# Patient Record
Sex: MALE | Race: WHITE | Employment: UNEMPLOYED | URBAN - METROPOLITAN AREA
[De-identification: names, ages, dates, MRNs, and addresses within clinical notes are randomized per-mention and may not be internally consistent; named-entity substitution may affect disease eponyms.]

---

## 2021-05-06 ENCOUNTER — HOSPITAL ENCOUNTER (INPATIENT)
Age: 46
LOS: 8 days | Discharge: HOME | DRG: 754 | End: 2021-05-14
Attending: PSYCHIATRY & NEUROLOGY | Admitting: PSYCHIATRY & NEUROLOGY
Payer: MEDICAID

## 2021-05-06 LAB
GLUCOSE BLD STRIP.AUTO-MCNC: 363 MG/DL (ref 65–100)
PERFORMED BY, TECHID: ABNORMAL

## 2021-05-06 PROCEDURE — 82962 GLUCOSE BLOOD TEST: CPT

## 2021-05-06 PROCEDURE — 65220000003 HC RM SEMIPRIVATE PSYCH

## 2021-05-07 PROBLEM — F32.A DEPRESSION: Status: ACTIVE | Noted: 2021-05-07
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
## 2021-05-07 PROBLEM — F19.10 SUBSTANCE ABUSE (HCC): Status: ACTIVE | Noted: 2021-05-07

## 2021-05-07 LAB
GLUCOSE BLD STRIP.AUTO-MCNC: 236 MG/DL (ref 65–100)
GLUCOSE BLD STRIP.AUTO-MCNC: 300 MG/DL (ref 65–100)
GLUCOSE BLD STRIP.AUTO-MCNC: 305 MG/DL (ref 65–100)
PERFORMED BY, TECHID: ABNORMAL

## 2021-05-07 PROCEDURE — 74011636637 HC RX REV CODE- 636/637: Performed by: FAMILY MEDICINE

## 2021-05-07 PROCEDURE — 82962 GLUCOSE BLOOD TEST: CPT

## 2021-05-07 PROCEDURE — 74011250637 HC RX REV CODE- 250/637: Performed by: PSYCHIATRY & NEUROLOGY

## 2021-05-07 PROCEDURE — 65220000003 HC RM SEMIPRIVATE PSYCH

## 2021-05-07 RX ORDER — IBUPROFEN 600 MG/1
600 TABLET ORAL
Status: DISCONTINUED | OUTPATIENT
Start: 2021-05-07 | End: 2021-05-14 | Stop reason: HOSPADM

## 2021-05-07 RX ORDER — MAGNESIUM SULFATE 100 %
4 CRYSTALS MISCELLANEOUS AS NEEDED
Status: DISCONTINUED | OUTPATIENT
Start: 2021-05-07 | End: 2021-05-13 | Stop reason: SDUPTHER

## 2021-05-07 RX ORDER — ACETAMINOPHEN 325 MG/1
650 TABLET ORAL
Status: DISCONTINUED | OUTPATIENT
Start: 2021-05-07 | End: 2021-05-14 | Stop reason: HOSPADM

## 2021-05-07 RX ORDER — CHLORDIAZEPOXIDE HYDROCHLORIDE 25 MG/1
25 CAPSULE, GELATIN COATED ORAL
Status: DISCONTINUED | OUTPATIENT
Start: 2021-05-07 | End: 2021-05-07

## 2021-05-07 RX ORDER — ASPIRIN 325 MG/1
100 TABLET, FILM COATED ORAL DAILY
Status: DISCONTINUED | OUTPATIENT
Start: 2021-05-07 | End: 2021-05-14 | Stop reason: HOSPADM

## 2021-05-07 RX ORDER — QUETIAPINE FUMARATE 100 MG/1
100 TABLET, FILM COATED ORAL 2 TIMES DAILY
Status: DISCONTINUED | OUTPATIENT
Start: 2021-05-07 | End: 2021-05-07

## 2021-05-07 RX ORDER — QUETIAPINE FUMARATE 100 MG/1
100 TABLET, FILM COATED ORAL 3 TIMES DAILY
Status: DISCONTINUED | OUTPATIENT
Start: 2021-05-07 | End: 2021-05-07

## 2021-05-07 RX ORDER — HYDROXYZINE 50 MG/1
50 TABLET, FILM COATED ORAL
Status: DISCONTINUED | OUTPATIENT
Start: 2021-05-07 | End: 2021-05-14 | Stop reason: HOSPADM

## 2021-05-07 RX ORDER — CHLORDIAZEPOXIDE HYDROCHLORIDE 25 MG/1
25 CAPSULE, GELATIN COATED ORAL 3 TIMES DAILY
Status: DISCONTINUED | OUTPATIENT
Start: 2021-05-07 | End: 2021-05-08

## 2021-05-07 RX ORDER — TRAZODONE HYDROCHLORIDE 50 MG/1
50 TABLET ORAL
Status: DISCONTINUED | OUTPATIENT
Start: 2021-05-07 | End: 2021-05-14 | Stop reason: HOSPADM

## 2021-05-07 RX ORDER — QUETIAPINE FUMARATE 300 MG/1
300 TABLET, FILM COATED ORAL
Status: DISCONTINUED | OUTPATIENT
Start: 2021-05-07 | End: 2021-05-14 | Stop reason: HOSPADM

## 2021-05-07 RX ORDER — ADHESIVE BANDAGE
30 BANDAGE TOPICAL DAILY PRN
Status: DISCONTINUED | OUTPATIENT
Start: 2021-05-07 | End: 2021-05-14 | Stop reason: HOSPADM

## 2021-05-07 RX ORDER — FOLIC ACID 1 MG/1
1 TABLET ORAL DAILY
Status: DISCONTINUED | OUTPATIENT
Start: 2021-05-08 | End: 2021-05-14 | Stop reason: HOSPADM

## 2021-05-07 RX ORDER — IBUPROFEN 200 MG
1 TABLET ORAL DAILY
Status: DISCONTINUED | OUTPATIENT
Start: 2021-05-07 | End: 2021-05-14 | Stop reason: HOSPADM

## 2021-05-07 RX ORDER — INSULIN LISPRO 100 [IU]/ML
INJECTION, SOLUTION INTRAVENOUS; SUBCUTANEOUS
Status: DISCONTINUED | OUTPATIENT
Start: 2021-05-07 | End: 2021-05-14 | Stop reason: HOSPADM

## 2021-05-07 RX ORDER — QUETIAPINE FUMARATE 100 MG/1
100 TABLET, FILM COATED ORAL 2 TIMES DAILY
Status: DISCONTINUED | OUTPATIENT
Start: 2021-05-08 | End: 2021-05-11

## 2021-05-07 RX ADMIN — TRAZODONE HYDROCHLORIDE 50 MG: 50 TABLET ORAL at 01:09

## 2021-05-07 RX ADMIN — HYDROXYZINE HYDROCHLORIDE 50 MG: 50 TABLET, FILM COATED ORAL at 11:35

## 2021-05-07 RX ADMIN — IBUPROFEN 600 MG: 600 TABLET ORAL at 14:18

## 2021-05-07 RX ADMIN — CHLORDIAZEPOXIDE HYDROCHLORIDE 25 MG: 25 CAPSULE ORAL at 11:35

## 2021-05-07 RX ADMIN — HYDROXYZINE HYDROCHLORIDE 50 MG: 50 TABLET, FILM COATED ORAL at 01:09

## 2021-05-07 RX ADMIN — QUETIAPINE FUMARATE 100 MG: 100 TABLET ORAL at 09:41

## 2021-05-07 RX ADMIN — QUETIAPINE FUMARATE 100 MG: 100 TABLET ORAL at 01:09

## 2021-05-07 RX ADMIN — CHLORDIAZEPOXIDE HYDROCHLORIDE 25 MG: 25 CAPSULE ORAL at 01:09

## 2021-05-07 RX ADMIN — CHLORDIAZEPOXIDE HYDROCHLORIDE 25 MG: 25 CAPSULE ORAL at 20:22

## 2021-05-07 RX ADMIN — QUETIAPINE FUMARATE 300 MG: 300 TABLET ORAL at 20:22

## 2021-05-07 RX ADMIN — INSULIN LISPRO 5 UNITS: 100 INJECTION, SOLUTION INTRAVENOUS; SUBCUTANEOUS at 22:00

## 2021-05-07 RX ADMIN — QUETIAPINE FUMARATE 100 MG: 100 TABLET ORAL at 15:08

## 2021-05-07 RX ADMIN — CHLORDIAZEPOXIDE HYDROCHLORIDE 25 MG: 25 CAPSULE ORAL at 15:08

## 2021-05-07 NOTE — BH NOTES
42 yr old white male admitted to [address] from [facility] Emergency Department. Admitted under the care of Dr. Kimberly Ruiz MD. Patient was brought to their ED by EMS with c/o suicidal ideation, he stated he had snorted xanax and drank a bottle of crown royal. Patient has a history of etoh, cocaine, heroin abuse. Has track marks bilateral arms. Alcohol level was 163.6, negative on all tox screens. Patient has a medical history of Hep C, neuropathy, and uncontrolled diabetes. Patient states he smokes a pack a day. No known allergies, vital signs on arrival to [address] [place] [address]: b/p 119/78, pulse 76, temp. 98.2, respirations 18, O2 98%. Safety search completed, orders received, orientated to the unit, placed on q15 min checks per unit protocol.

## 2021-05-07 NOTE — BH NOTES
PSYCHOSOCIAL ASSESSMENT  :Patient identifying info:   Xu Loaiza is a 42 y.o., male admitted 5/6/2021 10:11 PM     Presenting problem and precipitating factors: Pt is a 42year old male voluntarily admitted for SI with a plan to shoot himself or slit his wrist. Pt stated that he had been drinking and that he wanted to die. Pt reported that he did not wish to live anymore. Pt did not provide any other details. Mental status assessment: Pt presented flat, agitated and depressed. Pt was oriented to time, place, and location. Pt was very agitated and ended the assessment early because he was not feeling well. Pt provided minimal detail behind his answers. Strengths: Pt did not identify any strengths     Collateral information: Pt signed an ANDRZEJ for his sister     Current psychiatric /substance abuse providers and contact info: Pt denies any current psychiatric providers. Previous psychiatric/substance abuse providers and response to treatment: Pt reported that he has been hospitalized before at [facility]. Pt stated he had been there about 5-6 times. Pt reported being at [facility] when he was 14 for substance abuse     Family history of mental illness or substance abuse: Pt stated that his father and older sister both have bipolar pt reported that he has a cousin that is schizophrenic   Substance abuse history:    Social History     Tobacco Use    Smoking status: Not on file   Substance Use Topics    Alcohol use: Not on file     Pt repots that he uses alcohol, opiates and xanax. Pt reported that he would use any substance that he can get his hands on. Pt stated that he has had substance abuse treatment when he was 14years old at [facility]. Pt reports that he uses a significant amount of alcohol and drugs per day. Pt stated that he has seizures if he does not have alcohol.    History of biomedical complications associated with substance abuse : Seizures     Patient's current acceptance of treatment or motivation for change: Minimal     Family constellation: pt reported having one daughter who he has custody of but does not live with, he reports that she lives with her boyfriend. Pt does not speak to his mother or father. Pt reports that he has a younger sister Autumn Bacon who he speaks with. Is significant other involved?   n/a    Describe support system: Pt denies having any support     Describe living arrangements and home environment: Pt reports currently living in [place] in his daughters grandfathers home   Health issues:   Hospital Problems  Never Reviewed          Codes Class Noted POA    Substance abuse [facility]) ICD-10-CM: F19.10  ICD-9-CM: 305.90  2021 Unknown        [facility] ([facility] ICD-10-CM: I59. 8XXA  ICD-9-CM: E958.9  2021 Unknown        Depression ICD-10-CM: F32.9  ICD-9-CM: 197  2021 Unknown              Trauma history: Pt denies    Legal issues: Pt denies     History of  service: Pt denies     Financial status: Pt does not work     Denominational/cultural factors:  Unable to be assessed pt ended meeting early     Education/work history: Unable to be assessed pt ended meeting early     Have you been licensed as a health care professional (current or ):  Unable to be assessed pt ended meeting early     Leisure and recreation preferences: Unable to be assessed pt ended meeting early     Describe coping skills:Unable to be assessed pt ended meeting early     Yadira Jonas  2021

## 2021-05-07 NOTE — GROUP NOTE
IP  GROUP DOCUMENTATION INDIVIDUAL Group Therapy Note Date: 5/7/2021 Group Start Time: 1400 Group End Time: 1500 Group Topic: Special Track - Drug Topic SRM 2 [facility] ACUTE Carmine Khoury, RT 
 
[facility] GROUP DOCUMENTATION GROUP Group Therapy Note Protracted Withdrawal 
 
Attendees: 8 Attendance: Did not attend Additional Notes:  Pt was invited to attend group but did not.  
 
Jarrod Pina, RT

## 2021-05-07 NOTE — GROUP NOTE
KIMBERLY  GROUP DOCUMENTATION INDIVIDUAL Group Therapy Note Date: 5/7/2021 Group Start Time: 1500 Group End Time: 1736 Group Topic: Process Group - Inpatient [facility]M CARE MANAGEMENT MAGGIE Dodson  GROUP DOCUMENTATION GROUP Group Therapy Note Pts participated in process group therapy with this writer. Pts discussed thoughts feelings and emotions regarding mental health. Pts were encouraged to share and give feedback to peers. Attendees: 5/11 Attendance: Did not attend Charlie Tellez LCSW

## 2021-05-07 NOTE — GROUP NOTE
KIMBERLY  GROUP DOCUMENTATION INDIVIDUAL Group Therapy Note Date: 5/7/2021 Group Start Time: 1100 Group End Time: 6145 Group Topic: Education Group - Inpatient [facility]M CARE MANAGEMENT MAGGIE Chao  GROUP DOCUMENTATION GROUP Group Therapy Note Pts participated in Psychoeducational group therapy. Pts dicussed trauma and the brain, triggers, and trauma responses. Pts were encouraged to share and give feedback to others. Attendees: 5/11 Attendance: Did not attend Viktoriya Chavez LCSW

## 2021-05-07 NOTE — BH NOTES
[id number], dictation id    Temp (°F) 98.2 97.9  97.9 (36.6)         Pulse (Heart Rate) 76 5670  70        Resp Rate 18 18  18        /78 115/71120/81  120/81           Current Facility-Administered Medications:     hydrOXYzine HCL (ATARAX) tablet 50 mg, 50 mg, Oral, TID PRN, Pratik Garcia MD, 50 mg at 05/07/21 1135    traZODone (DESYREL) tablet 50 mg, 50 mg, Oral, QHS PRN, Pratik Garcia MD, 50 mg at 05/07/21 0109    acetaminophen (TYLENOL) tablet 650 mg, 650 mg, Oral, Q4H PRN, Pratik Garcia MD    magnesium hydroxide (MILK OF MAGNESIA) 400 mg/5 mL oral suspension 30 mL, 30 mL, Oral, DAILY PRN, Sita Moran MD    nicotine (NICODERM CQ) 14 mg/24 hr patch 1 Patch, 1 Patch, TransDERmal, DAILY, Sita Moran MD    chlordiazePOXIDE (LIBRIUM) capsule 25 mg, 25 mg, Oral, TID, Sita Moran MD, 25 mg at 05/07/21 1508    ibuprofen (MOTRIN) tablet 600 mg, 600 mg, Oral, Q6H PRN, Sita Moran MD, 600 mg at 05/07/21 1418    QUEtiapine (SEROquel) tablet 100 mg, 100 mg, Oral, TID, Sita Moran MD, 100 mg at 05/07/21 1508

## 2021-05-07 NOTE — BH NOTES
RICHARD     Pt repots that he uses alcohol, opiates and xanax. Pt reported that he would use any substance that he can get his hands on. Pt stated that he has had substance abuse treatment when he was 14years old at [facility]. Pt reports that he uses a significant amount of alcohol and drugs per day. Pt stated that he has seizures if he does not have alcohol.

## 2021-05-07 NOTE — BH NOTES
Patient visible on unit. Alert & oriented. Affect, flat. Appearance is disheveled. Patient is polite and cooperative with receiving medications; complains of anxiety, 10/10, before lunchtime. Accepted PO medications with no side effects reported. Up for meals, consuming 100%. No group sessions attended thus far. Patient states he is from [place] and graduated from [employer]. He reports using xanax off the street. Informed patient that his UDS was negative. He denies SI and contracts for safety. Awaiting medical H&P. Accucheck 0800 236mg/dL, 1200 306mg/dL. Encouraged to seek support from staff as needed. Will continue to monitor on close observation to ensure patient safety and follow treatment plan as written. 1400 Patient sitting on floor by nurse station, saying he wants to die, he is having pain, anxiety. [place] head repeatedly on wall. Re-directed and encouraged to lay on bed. Rates pain 10/10, anxiety 10/10. Complains of right leg pain and lower back pain. Dr. Demetrius Moreira contacted for medication modifications. Patient verbalized understanding. /81 HR 70.

## 2021-05-07 NOTE — GROUP NOTE
IP  GROUP DOCUMENTATION INDIVIDUAL Group Therapy Note Date: 5/7/2021 Group Start Time: 9429 Group End Time: 5947 Group Topic: Special Track - Drug Topic SRM 2 [facility] ACUTE Gerhardt Duff, RT 
 
[facility] GROUP DOCUMENTATION GROUP Group Therapy Note Virtual NA Meeting Attendees  5 Attendance: Did not attend Additional Notes: Pt was invited to attend group but did not.  
 
Aye Dougherty, RT

## 2021-05-07 NOTE — CONSULTS
Consult    Patient: Danette Ambriz MRN: [id number]  SSN: [id number]    YOB: 1975  Age: 42 y.o. Sex: male      Subjective:      Danette Ambriz is a 42 y.o. male who is being seen for alcohol abuse abuse depression and suicidal ideation complains of pain in his legs. No past medical history on file. No past surgical history on file. No family history on file. Social History     Tobacco Use    Smoking status: Not on file   Substance Use Topics    Alcohol use: Not on file      Current Facility-Administered Medications   Medication Dose Route Frequency Provider Last Rate Last Admin    hydrOXYzine HCL (ATARAX) tablet 50 mg  50 mg Oral TID PRN Carmine Levy MD   50 mg at 05/07/21 1135    traZODone (DESYREL) tablet 50 mg  50 mg Oral QHS PRN Carmine Levy MD   50 mg at 05/07/21 0109    acetaminophen (TYLENOL) tablet 650 mg  650 mg Oral Q4H PRN Carmine Levy MD        magnesium hydroxide (MILK OF MAGNESIA) 400 mg/5 mL oral suspension 30 mL  30 mL Oral DAILY PRN Carmine Levy MD        nicotine (NICODERM CQ) 14 mg/24 hr patch 1 Patch  1 Patch TransDERmal DAILY Sita Moran MD        chlordiazePOXIDE (LIBRIUM) capsule 25 mg  25 mg Oral TID Carmine Levy MD   25 mg at 05/07/21 1508    ibuprofen (MOTRIN) tablet 600 mg  600 mg Oral Q6H PRN Carmine Levy MD   600 mg at 05/07/21 1418    thiamine mononitrate (B-1) tablet 100 mg  100 mg Oral DAILY Carmine Levy MD       [address] [place] [address] [START ON 5/9/6786] folic acid (FOLVITE) tablet 1 mg  1 mg Oral DAILY Sita Moran MD        QUEtiapine (SEROquel) tablet 300 mg  300 mg Oral QHS Carmine Levy MD       [address] [place] [address] [START ON 5/8/2021] QUEtiapine (SEROquel) tablet 100 mg  100 mg Oral BID Carmine Levy MD            No Known Allergies    Review of Systems:  A comprehensive review of systems was negative except for that written in the History of Present Illness.     Objective:     Vitals:    05/07/21 0000 05/07/21 0044 05/07/21 0943 05/07/21 1403   BP: 119/78 119/78 115/71 120/81   Pulse: 76 76 (!) 56 70   Resp:  18 18    Temp:  98.2 °F (36.8 °C) 97.9 °F (36.6 °C)         Physical Exam:  General:  Alert, cooperative, no distress, appears stated age. Eyes:  Conjunctivae/corneas clear. PERRL, EOMs intact. Fundi benign   Ears:  Normal TMs and external ear canals both ears. Nose: Nares normal. Septum midline. Mucosa normal. No drainage or sinus tenderness. Mouth/Throat: Lips, mucosa, and tongue normal. Teeth and gums normal.   Neck: Supple, symmetrical, trachea midline, no adenopathy, thyroid: no enlargment/tenderness/nodules, no carotid bruit and no JVD. Back:   Symmetric, no curvature. ROM normal. No CVA tenderness. Lungs:   Clear to auscultation bilaterally. Heart:  Regular rate and rhythm, S1, S2 normal, no murmur, click, rub or gallop. Abdomen:   Soft, non-tender. Bowel sounds normal. No masses,  No organomegaly. Extremities: Extremities normal, atraumatic, no cyanosis or edema. Exaggerated response   Pulses: 2+ and symmetric all extremities. Skin: Skin color, texture, turgor normal. No rashes or lesions   Lymph nodes: Cervical, supraclavicular, and axillary nodes normal.   Neurologic: CNII-XII intact. Normal strength, sensation and reflexes throughout. Assessment:     Hospital Problems  Never Reviewed          Codes Class Noted POA    Substance abuse [facility]) ICD-10-CM: F19.10  ICD-9-CM: 305.90  5/7/2021 Unknown        Suicide ([facility] ICD-10-CM: Z31. 8XXA  ICD-9-CM: E958.9  5/7/2021 Unknown        Depression ICD-10-CM: F32.9  ICD-9-CM: 793  5/7/2021 Unknown          Drug-seeking behavior    Plan:     Continue present treatment avoid narcotics    Signed By: Bro De La Rosa MD     May 7, 2021

## 2021-05-08 LAB
GLUCOSE BLD STRIP.AUTO-MCNC: 217 MG/DL (ref 65–100)
GLUCOSE BLD STRIP.AUTO-MCNC: 292 MG/DL (ref 65–100)
GLUCOSE BLD STRIP.AUTO-MCNC: 300 MG/DL (ref 65–100)
GLUCOSE BLD STRIP.AUTO-MCNC: 318 MG/DL (ref 65–100)
PERFORMED BY, TECHID: ABNORMAL

## 2021-05-08 PROCEDURE — 82962 GLUCOSE BLOOD TEST: CPT

## 2021-05-08 PROCEDURE — 74011636637 HC RX REV CODE- 636/637: Performed by: FAMILY MEDICINE

## 2021-05-08 PROCEDURE — 74011250637 HC RX REV CODE- 250/637: Performed by: PSYCHIATRY & NEUROLOGY

## 2021-05-08 PROCEDURE — 65220000003 HC RM SEMIPRIVATE PSYCH

## 2021-05-08 RX ORDER — CHLORDIAZEPOXIDE HYDROCHLORIDE 10 MG/1
10 CAPSULE, GELATIN COATED ORAL 3 TIMES DAILY
Status: DISCONTINUED | OUTPATIENT
Start: 2021-05-08 | End: 2021-05-14 | Stop reason: HOSPADM

## 2021-05-08 RX ADMIN — QUETIAPINE FUMARATE 300 MG: 300 TABLET ORAL at 20:11

## 2021-05-08 RX ADMIN — THIAMINE HCL TAB 100 MG 100 MG: 100 TAB at 08:10

## 2021-05-08 RX ADMIN — INSULIN LISPRO 5 UNITS: 100 INJECTION, SOLUTION INTRAVENOUS; SUBCUTANEOUS at 12:11

## 2021-05-08 RX ADMIN — HYDROXYZINE HYDROCHLORIDE 50 MG: 50 TABLET, FILM COATED ORAL at 12:41

## 2021-05-08 RX ADMIN — INSULIN LISPRO 4 UNITS: 100 INJECTION, SOLUTION INTRAVENOUS; SUBCUTANEOUS at 20:11

## 2021-05-08 RX ADMIN — CHLORDIAZEPOXIDE HYDROCHLORIDE 10 MG: 10 CAPSULE ORAL at 20:11

## 2021-05-08 RX ADMIN — QUETIAPINE FUMARATE 100 MG: 100 TABLET ORAL at 14:16

## 2021-05-08 RX ADMIN — IBUPROFEN 600 MG: 600 TABLET ORAL at 12:41

## 2021-05-08 RX ADMIN — CHLORDIAZEPOXIDE HYDROCHLORIDE 10 MG: 10 CAPSULE ORAL at 16:54

## 2021-05-08 RX ADMIN — INSULIN LISPRO 3 UNITS: 100 INJECTION, SOLUTION INTRAVENOUS; SUBCUTANEOUS at 08:10

## 2021-05-08 RX ADMIN — QUETIAPINE FUMARATE 100 MG: 100 TABLET ORAL at 08:11

## 2021-05-08 RX ADMIN — HYDROXYZINE HYDROCHLORIDE 50 MG: 50 TABLET, FILM COATED ORAL at 02:51

## 2021-05-08 RX ADMIN — CHLORDIAZEPOXIDE HYDROCHLORIDE 25 MG: 25 CAPSULE ORAL at 08:10

## 2021-05-08 RX ADMIN — INSULIN LISPRO 4 UNITS: 100 INJECTION, SOLUTION INTRAVENOUS; SUBCUTANEOUS at 16:54

## 2021-05-08 RX ADMIN — FOLIC ACID 1 MG: 1 TABLET ORAL at 08:10

## 2021-05-08 NOTE — GROUP NOTE
IP  GROUP DOCUMENTATION INDIVIDUAL Group Therapy Note Date: 5/8/2021 Group Start Time: 1300 Group End Time: 6206 Group Topic: Process Group - Inpatient SRM 2 BH NON ACUTE [facility] GROUP DOCUMENTATION GROUP Group Therapy Note This writer facilitated a process group. The intended benefit of group is to encourage the patient to share their thoughts and feelings about their treatment, as well as, to seek advice from peers. This writer tasked the patient with completing three journal prompts. This writer discussed the importance of support systems, therapy, and boundaries. Attendees: 3 out of 9 Attendance: Attended Patient's Goal:  N/A Interventions/techniques: N/A Follows Directions: N/A Interactions: N/A Mental Status: N/A Behavior/appearance: N/A Goals Achieved: N/A Additional Notes:  Patient refused to attend group despite encouragement. Sandee Tracy

## 2021-05-08 NOTE — H&P
[address] [facility] HISTORY AND PHYSICAL    Name:  Dolly Brunson  MR#:  [id number]  :  1975  ACCOUNT #:  [de-identified]  ADMIT DATE:  2021    INITIAL PSYCHIATRIC ASSESSMENT    HISTORY OF PRESENT ILLNESS:  The patient is a 61-year-old admitted to the behavioral health floor on [facility] after the patient presenting to the emergency department with depression, suicidal ideation and substance abuse. The patient is a transfer from [facility] Emergency Department. The patient was brought to the ED by EMS. It was noted that the patient has a long history of cocaine use, heroin abuse and ethanol abuse and has multiple track marks in bilateral arms. Information is obtained from review of electronic records, talking to behavioral intake staff, talking to the patient, and information was obtained from the emergency department at [facility]. The patient this morning reports that he was suicidal and currently is suicidal.  The patient is more focused on expressing going through withdrawal from the substances and not feeling well. He reports having body aches and feeling anxious. The patient reports he wants to get back on his medications for his anxiety and withdrawals. Denies any hallucinations or feeling paranoid. Also, the patient reports depressed, feeling hopeless and helpless, poor sleep, loss of interest, lack of motivation. He denied any active intent of suicide at this time. The patient presented in the emergency department at [facility] with a plan to shoot himself or slit his wrist.  The patient had expressed that he had been drinking increasingly and wanted to die. He expressed that he does not want to live anymore. PAST MEDICAL HISTORY:  Includes hepatitis C, neuropathy, diabetes. SUBSTANCE USE:  Includes ethanol, cocaine, heroin abuse. Noted bilateral track marks on his arms.     PAST PSYCHIATRIC HISTORY:  He is noted to have had previous psychiatric hospitalization at [facility].  He has been there 5-6 times, and he has also been reported as being at [facility] and has been treated for substance abuse in the past.  He is not able to provide any current psychiatric providers that he is seeing. FAMILY HISTORY:  He states father and older sister both have bipolar disorder and a cousin who is schizophrenic. FAMILY AND PERSONAL HISTORY:  Noted has one daughter who he has custody of, but does not live with. It is reported that she lives with a boyfriend. The patient does not speak to his mother or father. He keeps in touch with his younger sister, Rowan Ahuja. TRAUMA/ABUSE HISTORY:  Denies. LEGAL ISSUES:  Denies. HISTORY OF  SERVICE:  The patient denies. MENTAL STATUS EXAM:  The patient is alert, oriented to name, place and person. The patient presents dysphoric, anxious, reports needing medications for his withdrawal.  The patient presented with active suicidal ideations with the plan as described above. No command hallucinations. No paranoia noted. Insight, judgment and coping remain impaired. Memory appears fair clinically, but did not cooperate on assessment for immediate recall or delayed recall. ASSESSMENT AND PLAN:  The patient to be placed on close observation, engage him in individual therapy, group therapy as he continues to improve. He is placed on Librium 25 mg three times a day, Seroquel 100 mg three times a day, and he is also placed on trazodone 50 at bedtime and ibuprofen 600 mg q.6 h. as needed for symptomatic treatment. We will place him on thiamine and folic acid. His vitals report temperature 97.9, heart rate of 70, respiratory rate of 18, blood pressure 120/81. LENGTH OF STAY:  5-7 days. STRENGTHS:  Overall appears healthy.     WEAKNESSES:  Poor primary support system and chronic substance abuse and has continued relapse just being a high risk factor for him, nonadherence to treatment recommendations and followup with his medications. To obtain further collateral from family and his previous , we will schedule for family meeting. The patient discussed in treatment team meeting. We will again obtain medical consult as the patient reports diabetes and for medical stabilization. We will contact previous pharmacy to obtain his medication list.    DISCHARGE CRITERIA:  The patient is able to show improvement in his neurovegetative symptoms of depression, anxiety, suicidal ideations, suicidal plans and substance abuse, to gain an insight. The patient is no longer actively suicidal or homicidal and is able to transition to substance abuse treatment.       Alexander Barnett MD      [id number]  D:  05/07/2021 16:52  T:  05/07/2021 22:38  JOB #:  [id number]

## 2021-05-08 NOTE — BH NOTES
SRM Recreational Therapy Assessment    Orientation:  Person, Place, Date and Situation    Reason for Admission:  Pt reported \"he was having suicidal thoughts and feeling depressed but didn't want to talk about why\" Pt reported \"he was using drugs and drinking alcohol\" Pt reported \"he use opiates, meth, cocaine, heroin, and take pills\"     Medical Precautions / Conditions:  Diabetes and Hep C per pt    Impairments:     Vision:  Wears Glasses No      Wears Contacts No      Are they with Patient n/a     Hearing Aids No     Utilization of Ambulatory Devices:  None    Health Problems Preventing Participation in Activities:  Yes  How:  Pt reported \"pt reported he has not been taking care of himself and he has pain in his foot and his legs are messed up\"    Leisure Interest Checklist:  Pt reported \"he has not been doing any leisure activites\"    Does patient participate in leisure activities:  No    Setting:  Alone    Other Activities / Skills / Talents:  None    Do emotions interfere with leisure activities / lifestyles:  Yes    When engaging in leisure activities, do you forget worries:  No    Do you belong to a Muslim:  No    Are you active in [employer] activities:  No    Typical Day:  Pt reported \"he just sit around, smoke cigarettes,get drunk and get high\"    Strengths:  Family Support, Housing and Reported \"hoping the next day be better\" and \"his sister is supportive\"    Limitations / Barriers:  Substance Abuse, Finances, Transportation, Motivation, Depressed Mood, Anxiety, Sleep, Concentration, Hallucinations, Paranoia, Anger / Aggression and Non-Compliance with Meds / Follow Up    Treatment Modalities:  Stress Management, Coping Skills, Symptom Recognition, Healthy Thinking, Mood Management and Leisure Skills    Patient Educational  Needs:  Skills to recognize and challenge problematic thinking, Identify positive coping strategies and skills to manage symptoms or moods, Leisure education and Recognition of symptoms and signs    Focus of Treatment:  Introduce positive outlets for self expression and Introduce and encourage the exploration of alternative coping skills    Summary:  Pt was cooperative during assessment. Pt reported he live alone in a family member house. Pt reported he is not working or receiving disability. Pt reported he has not been following up with any providers. Pt reported he was feeling depressed and having suicidal thoughts.  Pt reported his goal for hospitalization is \"to get level headed and work on a future plan\"

## 2021-05-08 NOTE — PROGRESS NOTES
Progress Note  Date:2021       Room:[facility]  Patient Name:Joe Blair     YOB: 1975     Age:46 y.o. Subjective    Subjective  Patient in bed this morning noted to be in no distress. He states he was able to sleep better last night. Did not voice any side effects he did not want to talk much this morning. We will continue to explore tapering down of his medications on Librium. Mental status examination-patient is alert oriented to name place person. Still appears dishelved, poor eye contact not voiced any active suicidal or homicidal ideations presents more dysphoric insight judgment coping remains impaired    Review of Systems no side effects were voices morning no withdrawal symptoms were voiced this morning  Objective         Vitals Last 24 Hours:  TEMPERATURE:  Temp  Av.8 °F (36.6 °C)  Min: 97.8 °F (36.6 °C)  Max: 97.8 °F (36.6 °C)  RESPIRATIONS RANGE: Resp  Av  Min: 16  Max: 18  PULSE OXIMETRY RANGE: No data recorded  PULSE RANGE: Pulse  Av.7  Min: 63  Max: 70  BLOOD PRESSURE RANGE: Systolic (48WWI), JSI:229 , Min:116 , UEV:781   ; Diastolic (25RET), GBU:02, Min:78, Max:83    I/O (24Hr): No intake or output data in the 24 hours ending 21 1055  Objective  Labs/Imaging/Diagnostics    Labs:  CBC:No results for input(s): WBC, RBC, HGB, HCT, MCV, RDW, PLT, HGBEXT, HCTEXT, PLTEXT in the last 72 hours. CHEMISTRIES:No results for input(s): NA, K, CL, CO2, BUN, CA, PHOS, MG in the last 72 hours. No lab exists for component: CREATININE, GLUCOSEPT/INR:No results for input(s): INR, INREXT in the last 72 hours. No lab exists for component: PROTIME  APTT:No results for input(s): APTT in the last 72 hours. LIVER PROFILE:No results for input(s): AST, ALT in the last 72 hours.     No lab exists for component: BILIDIR, BILITOT, ALKPHOS  No results found for: ALT, AST, GGT, GGTP, AP, APIT, APX, CBIL, TBIL, TBILI    Imaging Last 24 Hours:  No results found.   Assessment//Plan   Active Problems:    Substance abuse ([facility] (5/7/2021)      Suicide ([facility] (5/7/2021)      Depression (5/7/2021)      Assessment & Plan  Decrease Librium to 10 3 times daily  Continue current medications vitals are stable encourage participation in group therapy  2 obtain collateral and address inpatient rehab      Current Facility-Administered Medications:     hydrOXYzine HCL (ATARAX) tablet 50 mg, 50 mg, Oral, TID PRN, Christine Dangelo MD, 50 mg at 05/08/21 0251    traZODone (DESYREL) tablet 50 mg, 50 mg, Oral, QHS PRN, Christine Dangelo MD, 50 mg at 05/07/21 0109    acetaminophen (TYLENOL) tablet 650 mg, 650 mg, Oral, Q4H PRN, Sita Moran MD    magnesium hydroxide (MILK OF MAGNESIA) 400 mg/5 mL oral suspension 30 mL, 30 mL, Oral, DAILY PRN, Sita Moran MD    nicotine (NICODERM CQ) 14 mg/24 hr patch 1 Patch, 1 Patch, TransDERmal, DAILY, Sita Moran MD, 1 Patch at 05/08/21 9485    chlordiazePOXIDE (LIBRIUM) capsule 25 mg, 25 mg, Oral, TID, Sita Moran MD, 25 mg at 05/08/21 0810    ibuprofen (MOTRIN) tablet 600 mg, 600 mg, Oral, Q6H PRN, Sita Moran MD, 600 mg at 05/07/21 1418    thiamine mononitrate (B-1) tablet 100 mg, 100 mg, Oral, DAILY, Sita Moran MD, 100 mg at 03/82/87 3647    folic acid (FOLVITE) tablet 1 mg, 1 mg, Oral, DAILY, Sita Moran MD, 1 mg at 05/08/21 0810    QUEtiapine (SEROquel) tablet 300 mg, 300 mg, Oral, QHS, Sita Moran MD, Stopped at 05/07/21 2200    QUEtiapine (SEROquel) tablet 100 mg, 100 mg, Oral, BID, Sita Moran MD, 100 mg at 05/08/21 0811    insulin lispro (HUMALOG) injection, , SubCUTAneous, AC&HS, Gareth Mcdonald MD, 3 Units at 05/08/21 0810    glucose chewable tablet 16 g, 4 Tab, Oral, PRN, Gareth Mcdonald MD    glucagon (GLUCAGEN) injection 1 mg, 1 mg, IntraMUSCular, PRN, Chalo Mcclellan MD  Electronically signed by Haydee Taylor MD on 5/8/2021 at 10:55 AM

## 2021-05-08 NOTE — BH NOTES
Writer contacted Dr Hubert Saul for insulin orders, pt begun on Medium Dose Sliding Scale  Pt presents blunted, sad, multiple somatic complaints of \"I feel like shit,\" does not extrapolate, received in report that pt was med seeking and engaging in head-banging bx, no bx of this type noted by this writer, isolative to self in bed, requested meds early and retired to bed immediately after  No bx issues noted, oriented to all spheres, med compliant  Denies SI HI NSSI denies sleep med appetite problems denies AVH  Continuing to monitor

## 2021-05-08 NOTE — BH NOTES
DAYSHIFT NOTE:     Patient has a flat affect and is discheveled. Patient stated that he was \"doing okay\" this morning. Patient mumbles when he talks and can be very hard to understand. Patient stated to the writer, when he was asked what he was withdrawing from, he stated, \"benzo's and alcohol. \" Patient stated that he was \"tired\" and \"felt like crap. \" Patient stated that he had \"body aches. \" Patient rates his depression this morning as a 8/10. Patient rates his anxiety as a 10/10 and states, \"always. \" Denies SI/HI. Denies [address] \"yet. \" Patient accepts having his blood sugars checked prior to his meals. Patient is medication compliant. Patient does not attend groups. Patient drowsy at times. Around 1235pm patient came up to the nurses station and asked for something for anxiety. Vitals obtained with /76, heart rate 100. PRN Atarax and Motrin administered at this time. Patient went back to his room to lay down. Patient asking to see the medical doctor again, and states he saw the medical doctor yesterday and was told that he would be back. Close observations continued to ensure patient safety. Will continue to monitor.

## 2021-05-09 LAB
GLUCOSE BLD STRIP.AUTO-MCNC: 243 MG/DL (ref 65–100)
GLUCOSE BLD STRIP.AUTO-MCNC: 271 MG/DL (ref 65–100)
GLUCOSE BLD STRIP.AUTO-MCNC: 309 MG/DL (ref 65–100)
GLUCOSE BLD STRIP.AUTO-MCNC: 323 MG/DL (ref 65–100)
PERFORMED BY, TECHID: ABNORMAL

## 2021-05-09 PROCEDURE — 65220000003 HC RM SEMIPRIVATE PSYCH

## 2021-05-09 PROCEDURE — 74011636637 HC RX REV CODE- 636/637: Performed by: FAMILY MEDICINE

## 2021-05-09 PROCEDURE — 82962 GLUCOSE BLOOD TEST: CPT

## 2021-05-09 PROCEDURE — 74011250637 HC RX REV CODE- 250/637: Performed by: PSYCHIATRY & NEUROLOGY

## 2021-05-09 RX ORDER — BUPROPION HYDROCHLORIDE 100 MG/1
100 TABLET, EXTENDED RELEASE ORAL DAILY
Status: DISCONTINUED | OUTPATIENT
Start: 2021-05-09 | End: 2021-05-14 | Stop reason: HOSPADM

## 2021-05-09 RX ADMIN — IBUPROFEN 600 MG: 600 TABLET ORAL at 15:55

## 2021-05-09 RX ADMIN — INSULIN LISPRO 4 UNITS: 100 INJECTION, SOLUTION INTRAVENOUS; SUBCUTANEOUS at 11:56

## 2021-05-09 RX ADMIN — BUPROPION HYDROCHLORIDE 100 MG: 100 TABLET, EXTENDED RELEASE ORAL at 11:56

## 2021-05-09 RX ADMIN — INSULIN LISPRO 5 UNITS: 100 INJECTION, SOLUTION INTRAVENOUS; SUBCUTANEOUS at 20:33

## 2021-05-09 RX ADMIN — CHLORDIAZEPOXIDE HYDROCHLORIDE 10 MG: 10 CAPSULE ORAL at 20:33

## 2021-05-09 RX ADMIN — INSULIN LISPRO 3 UNITS: 100 INJECTION, SOLUTION INTRAVENOUS; SUBCUTANEOUS at 08:02

## 2021-05-09 RX ADMIN — INSULIN LISPRO 5 UNITS: 100 INJECTION, SOLUTION INTRAVENOUS; SUBCUTANEOUS at 17:00

## 2021-05-09 RX ADMIN — CHLORDIAZEPOXIDE HYDROCHLORIDE 10 MG: 10 CAPSULE ORAL at 15:55

## 2021-05-09 RX ADMIN — FOLIC ACID 1 MG: 1 TABLET ORAL at 08:02

## 2021-05-09 RX ADMIN — QUETIAPINE FUMARATE 100 MG: 100 TABLET ORAL at 08:02

## 2021-05-09 RX ADMIN — HYDROXYZINE HYDROCHLORIDE 50 MG: 50 TABLET, FILM COATED ORAL at 23:40

## 2021-05-09 RX ADMIN — IBUPROFEN 600 MG: 600 TABLET ORAL at 21:38

## 2021-05-09 RX ADMIN — CHLORDIAZEPOXIDE HYDROCHLORIDE 10 MG: 10 CAPSULE ORAL at 08:02

## 2021-05-09 RX ADMIN — QUETIAPINE FUMARATE 100 MG: 100 TABLET ORAL at 14:04

## 2021-05-09 RX ADMIN — THIAMINE HCL TAB 100 MG 100 MG: 100 TAB at 08:03

## 2021-05-09 RX ADMIN — QUETIAPINE FUMARATE 300 MG: 300 TABLET ORAL at 20:34

## 2021-05-09 NOTE — GROUP NOTE
IP  GROUP DOCUMENTATION INDIVIDUAL Group Therapy Note Date: 5/9/2021 Group Start Time: 1100 Group End Time: 1638 Group Topic: Process Group - Inpatient SRM 2 BH NON ACUTE [facility] GROUP DOCUMENTATION GROUP Group Therapy Note This writer facilitated a process group. The intended benefit of group is to encourage the patient to discuss their feelings and goal for treatment. This writer talked provided education on stress and the five stages of grief. This writer tasked the patient with completing a worksheet, \"Stress exploration. \" Attendees: 6 out of 10 Attendance: Did not attend Patient's Goal:  N/A Interventions/techniques: N/A Follows Directions: N/A Interactions: N/A Mental Status: N/A Behavior/appearance: N/A Goals Achieved: N/A Additional Notes:  Patient refused to attend group despite encouragement. Jami De La Rosa

## 2021-05-09 NOTE — BH NOTES
DAYSHIFT NOTE:    Patient stated that he was feeling \"a little bit better\" this morning. Patient rated his depression and anxiety as both a 6/10. Denies SI now. Denies HI. Denies AH/VH. Patient is compliant with having his accuchecks done and he is medication compliant. Patient stated that he still feels bad and his legs are achy but he is some better. Patient has not attended groups today and was encouraged by this writer to try to attend some groups and to try to get out the bed some during the day. Patient is up for his meals and asking for snacks (bananas) and states that his appetite is coming back. Patient asked for hygiene supplies and he took a shower today. Patient noted to sleep most the day this morning until this afternoon and then noted to be more awake after lunch and in his room talking to his roommate. Patient was started on Wellbutrin today and received his first dose. Close observations continued to ensure patient safety. Will continue to monitor.

## 2021-05-09 NOTE — BH NOTES
Pt presents flat, pleasant, cooperative, anxious, sad, shift spent in bed, limited insight into illness, occasionally slow to respond to prompts, cooperative w/ assessment, CIWA 0  No bx issues noted, oriented to all spheres, med compliant  Denies SI HI NSSI denies med appetite problems denies AVH  Continuing to monitor

## 2021-05-09 NOTE — PROGRESS NOTES
Progress Note  Date:2021       Room:[facility]  Patient Name:Joe Blair     YOB: 1975     Age:46 y.o. Subjective    Subjective   Patient noted resting in bed. Does not respond to questions well. Not appear to be in any distress. He states he is feeling okay. Mostly isolated to himself. No nausea or vomiting or withdrawal symptoms noted or voiced. No sleep difficulty was voiced. Mental status examination-patient is alert oriented to name place person mostly restricted to himself does not respond quickly. Depressed not in any distress appears comfortable insight judgment coping continues to remain impaired insight into his substance use continues to be impaired as well. No evidence of any active psychosis noted    Review of Systems  Objective         Vitals Last 24 Hours:  TEMPERATURE:  Temp  Av °F (36.7 °C)  Min: 97.7 °F (36.5 °C)  Max: 98.3 °F (36.8 °C)  RESPIRATIONS RANGE: Resp  Av  Min: 18  Max: 18  PULSE OXIMETRY RANGE: No data recorded  PULSE RANGE: Pulse  Av.5  Min: 63  Max: 78  BLOOD PRESSURE RANGE: Systolic (58YTE), FTH:372 , Min:119 , VGX:754   ; Diastolic (03TMG), JQA:88, Min:71, Max:84    I/O (24Hr): No intake or output data in the 24 hours ending 21 1016  Objective  Labs/Imaging/Diagnostics    Labs:  CBC:No results for input(s): WBC, RBC, HGB, HCT, MCV, RDW, PLT, HGBEXT, HCTEXT, PLTEXT in the last 72 hours. CHEMISTRIES:No results for input(s): NA, K, CL, CO2, BUN, CA, PHOS, MG in the last 72 hours. No lab exists for component: CREATININE, GLUCOSEPT/INR:No results for input(s): INR, INREXT in the last 72 hours. No lab exists for component: PROTIME  APTT:No results for input(s): APTT in the last 72 hours. LIVER PROFILE:No results for input(s): AST, ALT in the last 72 hours.     No lab exists for component: BILIDIR, BILITOT, ALKPHOS  No results found for: ALT, AST, GGT, GGTP, AP, APIT, APX, CBIL, TBIL, TBILI    Imaging Last 24 Hours:  No results found. Assessment//Plan   Active Problems:    Substance abuse ([facility] [address] (5/7/2021)      Suicide ([facility] [address] (5/7/2021)      Depression (5/7/2021)      Assessment & Plan  Added Wellbutrin and 100 mg p.o. daily to address underlying depression.   Continue current medications encouraged to participate in group therapy    Current Facility-Administered Medications:     buPROPion SR (WELLBUTRIN SR) tablet 100 mg, 100 mg, Oral, DAILY, Sita Moran MD    chlordiazePOXIDE (LIBRIUM) capsule 10 mg, 10 mg, Oral, TID, Sita Moran MD, 10 mg at 05/09/21 0802    hydrOXYzine HCL (ATARAX) tablet 50 mg, 50 mg, Oral, TID PRN, Patricio Herr MD, 50 mg at 05/08/21 1241    traZODone (DESYREL) tablet 50 mg, 50 mg, Oral, QHS PRN, Patricio Herr MD, 50 mg at 05/07/21 0109    acetaminophen (TYLENOL) tablet 650 mg, 650 mg, Oral, Q4H PRN, Particio Herr MD    magnesium hydroxide (MILK OF MAGNESIA) 400 mg/5 mL oral suspension 30 mL, 30 mL, Oral, DAILY PRN, Sita Moran MD    nicotine (NICODERM CQ) 14 mg/24 hr patch 1 Patch, 1 Patch, TransDERmal, DAILY, Sita Moran MD, 1 Patch at 05/09/21 0802    ibuprofen (MOTRIN) tablet 600 mg, 600 mg, Oral, Q6H PRN, Sita Moran MD, 600 mg at 05/08/21 1241    thiamine mononitrate (B-1) tablet 100 mg, 100 mg, Oral, DAILY, Patricio Herr MD, 100 mg at 29/49/35 4317    folic acid (FOLVITE) tablet 1 mg, 1 mg, Oral, DAILY, Sita Moran MD, 1 mg at 05/09/21 0802    QUEtiapine (SEROquel) tablet 300 mg, 300 mg, Oral, QHS, Sita Moran MD, Stopped at 05/08/21 2200    QUEtiapine (SEROquel) tablet 100 mg, 100 mg, Oral, BID, Sita Moran MD, 100 mg at 05/09/21 0802    insulin lispro (HUMALOG) injection, , SubCUTAneous, AC&HS, Gareth Mcdonald MD, 3 Units at 05/09/21 0802    glucose chewable tablet 16 g, 4 Tab, Oral, PRN, Gareth Mcdonald MD    glucagon (GLUCAGEN) injection 1 mg, 1 mg, IntraMUSCular, PRN, Sharon Stallings MD  Electronically signed by Mariana Corona MD on 5/9/2021 at 10:16 AM

## 2021-05-10 LAB
GLUCOSE BLD STRIP.AUTO-MCNC: 326 MG/DL (ref 65–100)
GLUCOSE BLD STRIP.AUTO-MCNC: 328 MG/DL (ref 65–100)
GLUCOSE BLD STRIP.AUTO-MCNC: 348 MG/DL (ref 65–100)
GLUCOSE BLD STRIP.AUTO-MCNC: 388 MG/DL (ref 65–100)
PERFORMED BY, TECHID: ABNORMAL

## 2021-05-10 PROCEDURE — 74011636637 HC RX REV CODE- 636/637: Performed by: FAMILY MEDICINE

## 2021-05-10 PROCEDURE — 74011250637 HC RX REV CODE- 250/637: Performed by: PSYCHIATRY & NEUROLOGY

## 2021-05-10 PROCEDURE — 65220000003 HC RM SEMIPRIVATE PSYCH

## 2021-05-10 PROCEDURE — 82962 GLUCOSE BLOOD TEST: CPT

## 2021-05-10 RX ORDER — MAG HYDROX/ALUMINUM HYD/SIMETH 200-200-20
30 SUSPENSION, ORAL (FINAL DOSE FORM) ORAL
Status: DISCONTINUED | OUTPATIENT
Start: 2021-05-10 | End: 2021-05-14 | Stop reason: HOSPADM

## 2021-05-10 RX ADMIN — INSULIN LISPRO 5 UNITS: 100 INJECTION, SOLUTION INTRAVENOUS; SUBCUTANEOUS at 11:55

## 2021-05-10 RX ADMIN — FOLIC ACID 1 MG: 1 TABLET ORAL at 08:47

## 2021-05-10 RX ADMIN — CHLORDIAZEPOXIDE HYDROCHLORIDE 10 MG: 10 CAPSULE ORAL at 08:46

## 2021-05-10 RX ADMIN — THIAMINE HCL TAB 100 MG 100 MG: 100 TAB at 08:46

## 2021-05-10 RX ADMIN — INSULIN LISPRO 6 UNITS: 100 INJECTION, SOLUTION INTRAVENOUS; SUBCUTANEOUS at 21:02

## 2021-05-10 RX ADMIN — QUETIAPINE FUMARATE 100 MG: 100 TABLET ORAL at 08:46

## 2021-05-10 RX ADMIN — IBUPROFEN 600 MG: 600 TABLET ORAL at 11:55

## 2021-05-10 RX ADMIN — QUETIAPINE FUMARATE 300 MG: 300 TABLET ORAL at 21:02

## 2021-05-10 RX ADMIN — QUETIAPINE FUMARATE 100 MG: 100 TABLET ORAL at 13:23

## 2021-05-10 RX ADMIN — BUPROPION HYDROCHLORIDE 100 MG: 100 TABLET, EXTENDED RELEASE ORAL at 08:46

## 2021-05-10 RX ADMIN — CHLORDIAZEPOXIDE HYDROCHLORIDE 10 MG: 10 CAPSULE ORAL at 21:02

## 2021-05-10 RX ADMIN — INSULIN LISPRO 5 UNITS: 100 INJECTION, SOLUTION INTRAVENOUS; SUBCUTANEOUS at 08:46

## 2021-05-10 RX ADMIN — INSULIN LISPRO 5 UNITS: 100 INJECTION, SOLUTION INTRAVENOUS; SUBCUTANEOUS at 15:52

## 2021-05-10 RX ADMIN — CHLORDIAZEPOXIDE HYDROCHLORIDE 10 MG: 10 CAPSULE ORAL at 15:46

## 2021-05-10 RX ADMIN — HYDROXYZINE HYDROCHLORIDE 50 MG: 50 TABLET, FILM COATED ORAL at 11:55

## 2021-05-10 RX ADMIN — ALUMINUM HYDROXIDE, MAGNESIUM HYDROXIDE, AND SIMETHICONE 30 ML: 200; 200; 20 SUSPENSION ORAL at 02:30

## 2021-05-10 NOTE — BH NOTES
Pt presents calm, pleasant, cooperative, on-task w/ limited focus and insight, c/o numbness and parasthesia in R leg

## 2021-05-10 NOTE — GROUP NOTE
IP  GROUP DOCUMENTATION INDIVIDUAL Group Therapy Note Date: 5/10/2021 Group Start Time: 1100 Group End Time: 5106 Group Topic: Education Group - Inpatient SRM 2 BH NON ACUTE [facility] GROUP DOCUMENTATION GROUP Group Therapy Note Facilitated group discussion focused on defense mechanisms and the different ways each patient uses them to defend themselves Attendees: 8/9 Attendance: Attended Patient's Goal:  *STG: Attends activities and groups Interventions/techniques: Informed and Supported Follows Directions: Did not follow directions Interactions: Interacted appropriately Mental Status: Calm Behavior/appearance: Attentive and Cooperative Goals Achieved: Able to engage in interactions and Able to listen to others Additional Notes:  Pt was receptive to intervention discussion. Pt declined to fill out his worksheet or share anything with the group. Pt was engaged in group discussion and was interactive with peers. Sophia Hernandez, RT Intern

## 2021-05-10 NOTE — BH NOTES
Collateral contact with sister Shannon Reagan. This worker left Shannon Reagan a message to return his call to discuss patient history, current support and discharge planning.

## 2021-05-10 NOTE — GROUP NOTE
IP  GROUP DOCUMENTATION INDIVIDUAL Group Therapy Note Date: 5/10/2021 Group Start Time: 1400 Group End Time: 1500 Group Topic: Special Track - Drug Topic SRM 2 [facility] ACUTE Andres Cristina, RT 
 
[facility] GROUP DOCUMENTATION GROUP Group Therapy Note [employer] Attendees: 7 Attendance: Attended Patient's Goal:  To attend groups Interventions/techniques: Informed Follows Directions: Followed directions Interactions: Interacted appropriately Mental Status: Calm Behavior/appearance: Attentive and Cooperative Goals Achieved: Able to listen to others and Able to receive feedback Additional Notes:  Pt attended group and was engaged Elvis Santiago RT

## 2021-05-10 NOTE — BH NOTES
Behavioral Health Treatment Team Note     Patient goal(s) for today: go to [facility]  Treatment team focus/goals: Collateral contact, discharge planning, treatment participation. Progress note: patient denied SI/HI or AH/VH as of this assessment. Patient is oriented x4. Patient presented with flat affect and stated that he is isolating himself, has low motivation to get out of bed, and does not even what his family and support to know that he is getting treatment. Patient informed this worker that prior to admission the patient has thoughts of taking one of his landlords rifles and shooting himself. The patient stated that he has not informed his fiance or family of his admission and was hesitant to allow this worker to speak with family or support because he feels isolated from them at this moment. This worker encouraged the patient to make family communication part of his goals because part of his treatment will focus on using the support he has to keep himself safe and healthy upon discharge. Patient was agreeable. Continued inpatient treatment recommended to allow patient opportunity to attend group and address hopelessness and isolation that was reported. And to allow collateral contact to be made with sister and to confirm that firearms are secure if he were to return to his home in [address].     LOS:  4  Expected LOS: 5-7 days    Insurance info/prescription coverage:  [facility] MEDICAID/[facility] 6  Date of last family contact:  No contact  Family requesting physician contact today:  no  Discharge plan:  Patient is open to crisis stabilization with coordination to inpatient substance use treatment. Guns in the home:  no   Outpatient provider(s): Will work with patient to identify. Participating treatment team members: Mary Panchal, * (assigned SW), Kylie Abreu.

## 2021-05-10 NOTE — PROGRESS NOTES
Progress Note  Date:5/10/2021       Room:[facility]  Patient Name:Joe Blair     YOB: 1975     Age:46 y.o. Subjective    Subjective   Patient and bed this morning. Not noted to having any acute withdrawals. Somewhat receptive this morning encouraged to participate in group therapy substance abuse groups and to address his depression suicidal feelings and continue to engage in safety planning. Mental status examination-patient is alert oriented x3 still presents disheveled, restricted to himself isolates not voiced any active suicidal homicidal feelings. Presents helpless and hopeless. Insight judgment coping remains impaired    Review of Systems no complaints  Objective         Vitals Last 24 Hours:  TEMPERATURE:  Temp  Av.9 °F (36.6 °C)  Min: 97.5 °F (36.4 °C)  Max: 98.3 °F (36.8 °C)  RESPIRATIONS RANGE: Resp  Av  Min: 18  Max: 18  PULSE OXIMETRY RANGE: No data recorded  PULSE RANGE: Pulse  Av  Min: 65  Max: 77  BLOOD PRESSURE RANGE: Systolic (69FAU), RPD:872 , Min:113 , NYS:400   ; Diastolic (96JMI), OQL:30, Min:75, Max:75    I/O (24Hr): No intake or output data in the 24 hours ending 05/10/21 1106  Objective  Labs/Imaging/Diagnostics    Labs:  CBC:No results for input(s): WBC, RBC, HGB, HCT, MCV, RDW, PLT, HGBEXT, HCTEXT, PLTEXT in the last 72 hours. CHEMISTRIES:No results for input(s): NA, K, CL, CO2, BUN, CA, PHOS, MG in the last 72 hours. No lab exists for component: CREATININE, GLUCOSEPT/INR:No results for input(s): INR, INREXT in the last 72 hours. No lab exists for component: PROTIME  APTT:No results for input(s): APTT in the last 72 hours. LIVER PROFILE:No results for input(s): AST, ALT in the last 72 hours. No lab exists for component: BILIDIR, BILITOT, ALKPHOS  No results found for: ALT, AST, GGT, GGTP, AP, APIT, APX, CBIL, TBIL, TBILI    Imaging Last 24 Hours:  No results found.   Assessment//Plan   Active Problems: Substance abuse ([facility] (5/7/2021)      Suicide ([facility] (5/7/2021)      Depression (5/7/2021)      Assessment & Plan  Encouraged to participate in group therapy substance abuse group safety planning  No side effects from his medications voiced  We will increase his Wellbutrin to 200 mg p.o. daily  DC Librium      Current Facility-Administered Medications:     alum-mag hydroxide-simeth (MYLANTA) oral suspension 30 mL, 30 mL, Oral, Q4H PRN, Sita Moran MD, 30 mL at 05/10/21 0230    buPROPion SR (WELLBUTRIN SR) tablet 100 mg, 100 mg, Oral, DAILY, Sita Moran MD, 100 mg at 05/10/21 0846    chlordiazePOXIDE (LIBRIUM) capsule 10 mg, 10 mg, Oral, TID, Sita Moran MD, 10 mg at 05/10/21 0846    hydrOXYzine HCL (ATARAX) tablet 50 mg, 50 mg, Oral, TID PRN, Harshal Hill MD, 50 mg at 05/09/21 2340    traZODone (DESYREL) tablet 50 mg, 50 mg, Oral, QHS PRN, Harshal Hill MD, 50 mg at 05/07/21 0109    acetaminophen (TYLENOL) tablet 650 mg, 650 mg, Oral, Q4H PRN, iSta Moran MD    magnesium hydroxide (MILK OF MAGNESIA) 400 mg/5 mL oral suspension 30 mL, 30 mL, Oral, DAILY PRN, Sita Moran MD    nicotine (NICODERM CQ) 14 mg/24 hr patch 1 Patch, 1 Patch, TransDERmal, DAILY, Sita Moran MD, 1 Patch at 05/10/21 0847    ibuprofen (MOTRIN) tablet 600 mg, 600 mg, Oral, Q6H PRN, Sita Moran MD, 600 mg at 05/09/21 2138    thiamine mononitrate (B-1) tablet 100 mg, 100 mg, Oral, DAILY, Sita Moran MD, 100 mg at 72/80/27 8332    folic acid (FOLVITE) tablet 1 mg, 1 mg, Oral, DAILY, Sita Moran MD, 1 mg at 05/10/21 0847    QUEtiapine (SEROquel) tablet 300 mg, 300 mg, Oral, QHS, Sita Moran MD, Stopped at 05/09/21 2200    QUEtiapine (SEROquel) tablet 100 mg, 100 mg, Oral, BID, Sita Moran MD, 100 mg at 05/10/21 0846    insulin lispro (HUMALOG) injection, , SubCUTAneous, AC&HS, Gareth Mcdonald MD, 5 Units at 05/10/21 0846    glucose chewable tablet 16 g, 4 Tab, Oral, PRN, Gareth Mcdonald MD    glucagon (GLUCAGEN) injection 1 mg, 1 mg, IntraMUSCular, PRN, Lorchristopher Callahan MD  Electronically signed by Blanche Mcadams MD on 5/10/2021 at 11:06 AM

## 2021-05-10 NOTE — GROUP NOTE
IP  GROUP DOCUMENTATION INDIVIDUAL Group Therapy Note Date: 5/10/2021 Group Start Time: 6949 Group End Time: 7813 Group Topic: Special Track - Drug Topic SRM 2 [facility] ACUTE Andrea Hardin, RT 
 
[facility] GROUP DOCUMENTATION GROUP Group Therapy Note [employer] Attendees: 7 Attendance: Attended Patient's Goal:  To attend groups Interventions/techniques: Informed Follows Directions: Followed directions Interactions: Interacted appropriately Mental Status: Calm Behavior/appearance: Attentive and Cooperative Goals Achieved: Able to listen to others and Able to receive feedback Additional Notes:  Pt attended group and was engaged.  
 
Chantel Barrientos, RT

## 2021-05-10 NOTE — BH NOTES
Patient alert and oriented. Up to solarium for meals, attended meeting. Appetite good. Patient endorses suicidal feelings at times with thoughts of overdosing. Patient laughed \"But I can't do that while I'm in here! \" Ambulating in hallway and room. Socializing with other patients. No distress noted.

## 2021-05-10 NOTE — BH NOTES
This worker received a call from Ivonne Solis at [facility]. Ivonne Solis stated due to the patient's isolation, plan to end his life with a gun, and depressive symptoms he was not eligible to enter into their program. Ivonne Solis recommended [facility] to this worker as a potential placement. This worker informed Ivonne Solis that [place] and [facility] provide a similar level of care and are providing that care for to the patient for the reasons she described. This worker informed Ivonne Solis that prior to his discharge we hope the patient will have denied SI/HI for some time and will have recived medication and talk therapy treatment for his symptoms as well as the completion of a safety plan. Which is why we would like to step him down to an inpatient substance use treatment setting upon his completion of treatment at this more acute setting. Ivonne Solis encouraged this worker to continue to send clinicals but that she could not guarantee admission.

## 2021-05-10 NOTE — PROGRESS NOTES
Mr. Lanie Weinstein actively participated in Spirituality Group about hope and change on 2 non-acute Behavioral Health unit. Chaplains will follow up if further referrals are requested. Chaplain Mer Mcadams M.Div.    can be reached by calling the  at [facility]  [phone]

## 2021-05-11 LAB
GLUCOSE BLD STRIP.AUTO-MCNC: 203 MG/DL (ref 65–117)
GLUCOSE BLD STRIP.AUTO-MCNC: 235 MG/DL (ref 65–117)
GLUCOSE BLD STRIP.AUTO-MCNC: 321 MG/DL (ref 65–117)
GLUCOSE BLD STRIP.AUTO-MCNC: 397 MG/DL (ref 65–117)
GLUCOSE BLD STRIP.AUTO-MCNC: 397 MG/DL (ref 65–117)
GLUCOSE BLD STRIP.AUTO-MCNC: 448 MG/DL (ref 65–117)
PERFORMED BY, TECHID: ABNORMAL

## 2021-05-11 PROCEDURE — 82962 GLUCOSE BLOOD TEST: CPT

## 2021-05-11 PROCEDURE — 74011636637 HC RX REV CODE- 636/637: Performed by: FAMILY MEDICINE

## 2021-05-11 PROCEDURE — 65220000003 HC RM SEMIPRIVATE PSYCH

## 2021-05-11 PROCEDURE — 74011250637 HC RX REV CODE- 250/637: Performed by: PSYCHIATRY & NEUROLOGY

## 2021-05-11 PROCEDURE — 74011250637 HC RX REV CODE- 250/637: Performed by: INTERNAL MEDICINE

## 2021-05-11 RX ORDER — METFORMIN HYDROCHLORIDE 500 MG/1
500 TABLET ORAL 2 TIMES DAILY WITH MEALS
Status: DISCONTINUED | OUTPATIENT
Start: 2021-05-11 | End: 2021-05-13

## 2021-05-11 RX ORDER — INSULIN LISPRO 100 [IU]/ML
18 INJECTION, SOLUTION INTRAVENOUS; SUBCUTANEOUS ONCE
Status: COMPLETED | OUTPATIENT
Start: 2021-05-11 | End: 2021-05-11

## 2021-05-11 RX ORDER — METFORMIN HYDROCHLORIDE 500 MG/1
500 TABLET ORAL ONCE
Status: COMPLETED | OUTPATIENT
Start: 2021-05-11 | End: 2021-05-11

## 2021-05-11 RX ADMIN — METFORMIN HYDROCHLORIDE 500 MG: 500 TABLET ORAL at 17:18

## 2021-05-11 RX ADMIN — HYDROXYZINE HYDROCHLORIDE 50 MG: 50 TABLET, FILM COATED ORAL at 14:21

## 2021-05-11 RX ADMIN — BUPROPION HYDROCHLORIDE 100 MG: 100 TABLET, EXTENDED RELEASE ORAL at 08:00

## 2021-05-11 RX ADMIN — THIAMINE HCL TAB 100 MG 100 MG: 100 TAB at 08:01

## 2021-05-11 RX ADMIN — QUETIAPINE FUMARATE 300 MG: 300 TABLET ORAL at 21:10

## 2021-05-11 RX ADMIN — ALUMINUM HYDROXIDE, MAGNESIUM HYDROXIDE, AND SIMETHICONE 30 ML: 200; 200; 20 SUSPENSION ORAL at 23:08

## 2021-05-11 RX ADMIN — QUETIAPINE FUMARATE 100 MG: 100 TABLET ORAL at 08:01

## 2021-05-11 RX ADMIN — CHLORDIAZEPOXIDE HYDROCHLORIDE 10 MG: 10 CAPSULE ORAL at 16:10

## 2021-05-11 RX ADMIN — METFORMIN HYDROCHLORIDE 500 MG: 500 TABLET ORAL at 13:09

## 2021-05-11 RX ADMIN — INSULIN LISPRO 5 UNITS: 100 INJECTION, SOLUTION INTRAVENOUS; SUBCUTANEOUS at 07:58

## 2021-05-11 RX ADMIN — CHLORDIAZEPOXIDE HYDROCHLORIDE 10 MG: 10 CAPSULE ORAL at 21:10

## 2021-05-11 RX ADMIN — INSULIN LISPRO 18 UNITS: 100 INJECTION, SOLUTION INTRAVENOUS; SUBCUTANEOUS at 20:39

## 2021-05-11 RX ADMIN — INSULIN LISPRO 6 UNITS: 100 INJECTION, SOLUTION INTRAVENOUS; SUBCUTANEOUS at 11:48

## 2021-05-11 RX ADMIN — IBUPROFEN 600 MG: 600 TABLET ORAL at 14:21

## 2021-05-11 RX ADMIN — INSULIN LISPRO 6 UNITS: 100 INJECTION, SOLUTION INTRAVENOUS; SUBCUTANEOUS at 17:09

## 2021-05-11 RX ADMIN — CHLORDIAZEPOXIDE HYDROCHLORIDE 10 MG: 10 CAPSULE ORAL at 08:00

## 2021-05-11 RX ADMIN — FOLIC ACID 1 MG: 1 TABLET ORAL at 08:01

## 2021-05-11 NOTE — BH NOTES
Behavioral Health Treatment Team Note     Patient goal(s) for today: Coordinate with inpatient substance use treatment program  Treatment team focus/goals: Treatment participation, discharge planning, medication compliance    Progress note: Patient denied SI/HI or AH/VH and is oriented x4. Patient presented dishevled during meeting. Patient stated that he is willing to enter into either [place] or [facility] upon discharge, but wants to go to his home in [facility] first in order to  his clothes and some of his belongings. This worker informed the patient that if he enters into a crisis program prior to admission into a substance use treatment program that it is likely that the crisis program can provide transportation to his home, but this workers recommendation is that that patient discharge from [facility] directly to the next program and to coordinate with his sister to bring his clothing from his home to his next placement or to [facility] prior to discharge. Continued inpatient treatment recommended to address flat affect and symptoms of depression, coordinate transition to inpatient substance use treatment as without this coordination it is likely that the patient will relapse and his mental health will decompensate. LOS:  5  Expected LOS: 5-7 days    Insurance info/prescription coverage:  [facility] MEDICAID/[facility]  Date of last family contact:  No contact  Family requesting physician contact today:  no  Discharge plan:  Inpatient substance use treatment  Guns in the home:  Yes with father in law  Outpatient provider(s):   Will coordinate with patient to determine    Participating treatment team members: Praveen Stone, * (assigned SW), Jaspal Michael LMSW

## 2021-05-11 NOTE — PROGRESS NOTES
Problem: Suicide  Goal: *STG: Remains safe in hospital  Outcome: Progressing Towards Goal   Pt remains safe while in the hospital. No self injurious behavior noted or reported. Problem: Suicide  Goal: *STG/LTG: Complies with medication therapy  Outcome: Progressing Towards Goal   Pt is med compliant. Problem: Falls - Risk of  Goal: *Absence of Falls  Description: Document Katerina Balderas Fall Risk and appropriate interventions in the flowsheet. Outcome: Progressing Towards Goal  Note: Fall Risk Interventions:     Pt ambulates independently and without difficulty. No falls reported or noted.        Medication Interventions: Teach patient to arise slowly

## 2021-05-11 NOTE — GROUP NOTE
IP  GROUP DOCUMENTATION INDIVIDUAL Group Therapy Note Date: 5/11/2021 Group Start Time: 3983 Group End Time: 9373 Group Topic: Education Group - Inpatient SRM 2 BH NON ACUTE Mamadou Sargent [facility] GROUP DOCUMENTATION GROUP Group Therapy Note Facilitated group discussion based on challenging automatic negative thoughts while explaining the process of how to complete a thought record based on those automatic negative thoughts Attendees: 5/10 Attendance: Attended Patient's Goal:  *STG:  Verbalizes alternative ways of dealing with maladaptive feelings/behaviors Interventions/techniques: Challenged, Informed and Supported Follows Directions: Followed directions Interactions: Interacted appropriately Mental Status: Calm Behavior/appearance: Attentive and Cooperative Goals Achieved: Able to engage in interactions, Able to listen to others, Able to reflect/comment on own behavior, Able to receive feedback, Able to self-disclose and Identified feelings Additional Notes:  Pt was receptive to intervention discussion. Pt was able to challenge his automatic negative thoughts by completing a thought record. Pt challenged his thought stating \"I know if I go to rehab it can help me get stable. I have more hope than I probably realize\". Sasha Rothman, RT Intern

## 2021-05-11 NOTE — GROUP NOTE
IP  GROUP DOCUMENTATION INDIVIDUAL Group Therapy Note Date: 5/11/2021 Group Start Time: 1400 Group End Time: 1500 Group Topic: Special Track - Drug Topic SRM 2 BEHA HLTH ACUTE Isak Davey, RT 
 
[facility] GROUP DOCUMENTATION GROUP Group Therapy Note Surrender Attendees: 9 Attendance: Attended Patient's Goal:  To attend groups Interventions/techniques: Informed Follows Directions: Followed directions Interactions: Interacted appropriately Mental Status: Calm Behavior/appearance: Attentive and Cooperative Goals Achieved: Able to listen to others and Able to receive feedback Additional Notes:  Pt attended group and was engaged.  
 
Dominic Jaimes, RT

## 2021-05-11 NOTE — BH NOTES
Dr. Luico Fails paged at 1155am due to patient having two consecutive blood sugar readings greater than 300 per the order. Morning accucheck is 321 and afternoon accucheck is 397. Awaiting his call back for further instruction/orders. Dr. Lucio Fails returned page at [phone] and orders received to start metformin 500mg BID and to hold if blood sugar less than 120.

## 2021-05-11 NOTE — BH NOTES
Collateral conversation with patient's sister Romaine Chow. This worker spoke with patient's sister Romaine Chow for collateral, to provide an update, discuss coordination of patient's discharge, collect collateral information and provide an update. Romaine Chow informed this worker that the patient has experienced issues with mental healthy, physical health, and addiction since he was a teenager and she has been there for him as much as possible. Romaine Chow stated that she and her family are currently in quarantine awaiting results of a Covid test and that will dictate how much she is able to help the patient in transitioning his next treatment. Romaine Chow was informed that it is this worker recommendation that the patient enter into a substance use treatment program directly and that the patient is insisting that her stop by his home to  some clothing, a paycheck, and some other belongings. Romaine Chow asked about the patient discharge date and wanted to make sure his medical issues are being addressed. Romaine Chow is willing to participate in a family meeting that the  following this worker can schedule and facilitate.

## 2021-05-11 NOTE — BH NOTES
DAYSHIFT NOTE:     Patient up this morning and in the hallway, disheveled, and asking for something for anxiety. Patient was educated that we had to get his vital signs first and writer would check his medications. Patient accepted having his vitals done and also was compliant with his morning accucheck. Writer administered his morning medications and he was educated that some of the medications he was receiving would help with his anxiety and to notify the writer if his anxiety continued. Patient was noted to sleep after receiving his morning medications and after eating breakfast. Patient complained about not getting enough food this morning for breakfast and he wanted double portions and it was explained to the patient that diabetics do not get double portions due to carb control. Dr. Markus Williamson made changes with his seroquel and stopped him getting seroquel during the day and continued only for bedtime. Patient rated his depression and anxiety as 8/10. Denies SI while being in the hospital and feels safe here but states, \"I do not know what I may do if discharged. \" Denies HI. Denies AH/VH. Patient states that he still \"feels crappy. \"     Patient can be irritable at times but is able to process with the writer with what is bothering him. Patient stated that he could not understand Dr. Markus Williamson and she had told him that he was doing well and would be discharged within the next couple of days. Patient voiced concerns about discharge and stated that he could not go back to his living situation of how he was living. Patient voiced that he wanted to go to rehab and he would go today but he just wanted to get some things that were valuable to him and his clothes before he went. Patient stated that he definitely wanted rehab and did not want to go back to how he was living.  Patient voiced concerns that he needed to go and  his stimulus check that he never cashed and stated that the people that he lived with were the type that would forge his name and cash it and he wanted to  his check and some clothes. Patient continues to need encouragement to remain positive. Patient appreciative for the help and thankful. Close observations continued to ensure patient safety. Will continue to monitor.

## 2021-05-11 NOTE — GROUP NOTE
IP  GROUP DOCUMENTATION INDIVIDUAL Group Therapy Note Date: 5/11/2021 Group Start Time: 6098 Group End Time: 5607 Group Topic: Special Track - Drug Topic SRM 2 BEHA HLTH ACUTE Kizzy Agent, RT 
 
[facility] GROUP DOCUMENTATION GROUP Group Therapy Note Stages of Change Attendees: 7 Attendance: Attended Patient's Goal:  To attend groups Interventions/techniques: Informed Follows Directions: Followed directions Interactions: Interacted appropriately Mental Status: Calm Behavior/appearance: Attentive and Cooperative Goals Achieved: Able to listen to others and Able to receive feedback Additional Notes:  Pt attended group and was engaged.  
 
Eder Zhang, RT

## 2021-05-11 NOTE — GROUP NOTE
IP  GROUP DOCUMENTATION INDIVIDUAL Group Therapy Note Date: 5/11/2021 Group Start Time: 0528 Group End Time: 5496 Group Topic: Recreational/Music Therapy SRM 2  NON ACUTE Jazmin Kolb [facility] GROUP DOCUMENTATION GROUP Group Therapy Note Facilitated leisure skills group focused on positive coping skills through social interactions, group activities, music and art tasks Attendees: 7/10 Attendance: Attended Patient's Goal: *STG: Attends activities and groups Interventions/techniques: Art integration and Supported Follows Directions: Followed directions Interactions: Interacted appropriately Mental Status: Calm Behavior/appearance: Cooperative Goals Achieved: Able to engage in interactions and Able to listen to others Additional Notes:  Pt was receptive to music and songs he selected while in group. Pt declined to work on leisure packet but was observed interacting with peers. Jerri Zamarripa, RT Intern

## 2021-05-11 NOTE — GROUP NOTE
IP  GROUP DOCUMENTATION INDIVIDUAL Group Therapy Note Date: 5/10/2021 Group Start Time: 200 Group End Time: 2000 Group Topic: Recreational/Music Therapy SRM 2  NON ACUTE Mary Connell IP  GROUP DOCUMENTATION GROUP Group Therapy Note Attendees: 7 Introduce healthy leisure task skill group as positive way to cope and manage mood Attendance: Attended Patient's Goal:  STG: Attends activities and groups Interventions/techniques: Art integration and Supported Follows Directions: Followed directions Interactions: Interacted appropriately Mental Status: Calm and Flat Behavior/appearance: Attentive and Cooperative Goals Achieved: Able to engage in interactions and Able to listen to others Additional Notes: Attended group with only 15 minutes left. Pt selected songs to listen to in group. Was receptive to intervention. Verbalized enjoyment of listening to music in group. Was interactive with staff in group.   
 
 
Olivier Vila, CTRS

## 2021-05-11 NOTE — PROGRESS NOTES
Patient is alert and orientated x4, patient spent some time in the dayroom socializing with peers. Patient expressed interest in wanting to learn more about his diabetes and what he can eat. Patient denied HI and only stated SI if he gets out, that he might kill himself when he gets out of here, but stated no plan. Patient denies [address], patient states his anxiety is 6/10 and depression 10/10. Patient BS was 388 and received sliding scale 6u. Patient CIWA score was a 6 due to the stated anxiety score of 6/10. Patient is medication compliant, patient expressed interest in speaking with a dietician. Patient attended evening group. Patient had a quiet evening, slept most of shift. Will continue to monitor per unit protocol.

## 2021-05-12 LAB
GLUCOSE BLD STRIP.AUTO-MCNC: 216 MG/DL (ref 65–117)
GLUCOSE BLD STRIP.AUTO-MCNC: 262 MG/DL (ref 65–117)
GLUCOSE BLD STRIP.AUTO-MCNC: 368 MG/DL (ref 65–117)
GLUCOSE BLD STRIP.AUTO-MCNC: 398 MG/DL (ref 65–117)
GLUCOSE BLD STRIP.AUTO-MCNC: 399 MG/DL (ref 65–117)
GLUCOSE BLD STRIP.AUTO-MCNC: 400 MG/DL (ref 65–117)
PERFORMED BY, TECHID: ABNORMAL

## 2021-05-12 PROCEDURE — 82962 GLUCOSE BLOOD TEST: CPT

## 2021-05-12 PROCEDURE — 74011250637 HC RX REV CODE- 250/637: Performed by: INTERNAL MEDICINE

## 2021-05-12 PROCEDURE — 65220000003 HC RM SEMIPRIVATE PSYCH

## 2021-05-12 PROCEDURE — 74011636637 HC RX REV CODE- 636/637: Performed by: INTERNAL MEDICINE

## 2021-05-12 PROCEDURE — 74011636637 HC RX REV CODE- 636/637: Performed by: FAMILY MEDICINE

## 2021-05-12 PROCEDURE — 74011250637 HC RX REV CODE- 250/637: Performed by: PSYCHIATRY & NEUROLOGY

## 2021-05-12 RX ORDER — INSULIN LISPRO 100 [IU]/ML
15 INJECTION, SOLUTION INTRAVENOUS; SUBCUTANEOUS ONCE
Status: COMPLETED | OUTPATIENT
Start: 2021-05-12 | End: 2021-05-12

## 2021-05-12 RX ADMIN — IBUPROFEN 600 MG: 600 TABLET ORAL at 08:49

## 2021-05-12 RX ADMIN — CHLORDIAZEPOXIDE HYDROCHLORIDE 10 MG: 10 CAPSULE ORAL at 16:29

## 2021-05-12 RX ADMIN — HYDROXYZINE HYDROCHLORIDE 50 MG: 50 TABLET, FILM COATED ORAL at 12:08

## 2021-05-12 RX ADMIN — CHLORDIAZEPOXIDE HYDROCHLORIDE 10 MG: 10 CAPSULE ORAL at 21:12

## 2021-05-12 RX ADMIN — METFORMIN HYDROCHLORIDE 500 MG: 500 TABLET ORAL at 08:49

## 2021-05-12 RX ADMIN — CHLORDIAZEPOXIDE HYDROCHLORIDE 10 MG: 10 CAPSULE ORAL at 08:49

## 2021-05-12 RX ADMIN — INSULIN LISPRO 15 UNITS: 100 INJECTION, SOLUTION INTRAVENOUS; SUBCUTANEOUS at 21:12

## 2021-05-12 RX ADMIN — INSULIN LISPRO 6 UNITS: 100 INJECTION, SOLUTION INTRAVENOUS; SUBCUTANEOUS at 12:07

## 2021-05-12 RX ADMIN — BUPROPION HYDROCHLORIDE 100 MG: 100 TABLET, EXTENDED RELEASE ORAL at 08:49

## 2021-05-12 RX ADMIN — FOLIC ACID 1 MG: 1 TABLET ORAL at 08:49

## 2021-05-12 RX ADMIN — INSULIN LISPRO 4 UNITS: 100 INJECTION, SOLUTION INTRAVENOUS; SUBCUTANEOUS at 08:46

## 2021-05-12 RX ADMIN — INSULIN LISPRO 15 UNITS: 100 INJECTION, SOLUTION INTRAVENOUS; SUBCUTANEOUS at 17:30

## 2021-05-12 RX ADMIN — METFORMIN HYDROCHLORIDE 500 MG: 500 TABLET ORAL at 16:29

## 2021-05-12 RX ADMIN — QUETIAPINE FUMARATE 300 MG: 300 TABLET ORAL at 21:12

## 2021-05-12 RX ADMIN — THIAMINE HCL TAB 100 MG 100 MG: 100 TAB at 08:49

## 2021-05-12 NOTE — GROUP NOTE
IP  GROUP DOCUMENTATION INDIVIDUAL Group Therapy Note Date: 5/12/2021 Group Start Time: 1400 Group End Time: 1500 Group Topic: Special Track - Drug Topic SRM 2 BEHA HLTH ACUTE Joon Chen, RT 
 
[facility] GROUP DOCUMENTATION GROUP Group Therapy Note Recovery & Relapse Attendees: 7 Attendance: Attended Patient's Goal:  To attend groups Interventions/techniques: Informed Follows Directions: Followed directions Interactions: Interacted appropriately Mental Status: Calm Behavior/appearance: Attentive and Cooperative Goals Achieved: Able to listen to others and Able to receive feedback Additional Notes:  Pt attended group and was engaged.  
 
Eliel Levy, RT

## 2021-05-12 NOTE — PROGRESS NOTES
Progress Note    Patient: Jeffry Roldan MRN: [id number]  SSN: [id number]    YOB: 1975  Age: 42 y.o. Sex: male      Admit Date: 5/6/2021    LOS: 6 days     Subjective:     No chest pain no shortness of breath no cough    Objective:     Vitals:    05/10/21 2026 05/11/21 0832 05/11/21 2001 05/12/21 0905   BP: 106/72 107/68 (!) 104/57 101/65   Pulse: 97 70 (!) 119 83   Resp: 14 17 18 18   Temp: 97.8 °F (36.6 °C) 97.9 °F (36.6 °C) 98.3 °F (36.8 °C) 97.8 °F (36.6 °C)        Intake and Output:  Current Shift: No intake/output data recorded. Last three shifts: No intake/output data recorded. Physical Exam:   General:  Alert, cooperative, no distress, appears stated age. Eyes:  Conjunctivae/corneas clear. PERRL, EOMs intact. Fundi benign   Ears:  Normal TMs and external ear canals both ears. Nose: Nares normal. Septum midline. Mucosa normal. No drainage or sinus tenderness. Mouth/Throat: Lips, mucosa, and tongue normal. Teeth and gums normal.   Neck: Supple, symmetrical, trachea midline, no adenopathy, thyroid: no enlargment/tenderness/nodules, no carotid bruit and no JVD. Back:   Symmetric, no curvature. ROM normal. No CVA tenderness. Lungs:   Clear to auscultation bilaterally. Heart:  Regular rate and rhythm, S1, S2 normal, no murmur, click, rub or gallop. Abdomen:   Soft, non-tender. Bowel sounds normal. No masses,  No organomegaly. Extremities: Extremities normal, atraumatic, no cyanosis or edema. Pulses: 2+ and symmetric all extremities. Skin: Skin color, texture, turgor normal. No rashes or lesions   Lymph nodes: Cervical, supraclavicular, and axillary nodes normal.   Neurologic: CNII-XII intact. Normal strength, sensation and reflexes throughout. Lab/Data Review: All lab results for the last 24 hours reviewed.      Recent Results (from the past 24 hour(s))   GLUCOSE, POC    Collection Time: 05/11/21  4:46 PM   Result Value Ref Range    Glucose (POC) 397 (H) 65 - 117 mg/dL    Performed by Elder El, POC    Collection Time: 05/11/21  8:09 PM   Result Value Ref Range    Glucose (POC) 448 (H) 65 - 117 mg/dL    Performed by [address], POC    Collection Time: 05/11/21  9:48 PM   Result Value Ref Range    Glucose (POC) 235 (H) 65 - 117 mg/dL    Performed by Linda GRUBER Parrott)    GLUCOSE, POC    Collection Time: 05/11/21 11:03 PM   Result Value Ref Range    Glucose (POC) 203 (H) 65 - 117 mg/dL    Performed by [address], POC    Collection Time: 05/12/21  7:50 AM   Result Value Ref Range    Glucose (POC) 262 (H) 65 - 117 mg/dL    Performed by Bakari Elliott    GLUCOSE, POC    Collection Time: 05/12/21 11:07 AM   Result Value Ref Range    Glucose (POC) 399 (H) 65 - 117 mg/dL    Performed by Bakari Elliott          Assessment:     Active Problems:    Substance abuse ([facility] [address] (5/7/2021)      Suicide ([facility] (5/7/2021)      Depression (5/7/2021)        Plan:     Continue present treatment    Signed By: Argentina Thorpe MD     May 12, 2021

## 2021-05-12 NOTE — PROGRESS NOTES
Progress Note  Date:2021       Room:[facility]  Patient Name:Joe Blair     YOB: 1975     Age:46 y.o. Subjective    Subjective  Patient is presenting slightly better. He still continues to have impaired insight into his substance use. He strongly feels he will relapse and states nothing can help him. He denies any plan of suicide or homicide sleep has been better. Intentional active SI HI he has been more appropriately dressed and noted long conversation insight judgment coping remains limited  Review of Systems  Objective         Vitals Last 24 Hours:  TEMPERATURE:  Temp  Av.1 °F (36.7 °C)  Min: 97.9 °F (36.6 °C)  Max: 98.3 °F (36.8 °C)  RESPIRATIONS RANGE: Resp  Av.5  Min: 17  Max: 18  PULSE OXIMETRY RANGE: No data recorded  PULSE RANGE: Pulse  Av.5  Min: 70  Max: 119  BLOOD PRESSURE RANGE: Systolic (89CAE), LJK:243 , Min:104 , OAE:651   ; Diastolic (02ISE), VAM:03, Min:57, Max:68    I/O (24Hr): No intake or output data in the 24 hours ending 21 2319  Objective  Labs/Imaging/Diagnostics    Labs:  CBC:No results for input(s): WBC, RBC, HGB, HCT, MCV, RDW, PLT, HGBEXT, HCTEXT, PLTEXT in the last 72 hours. CHEMISTRIES:No results for input(s): NA, K, CL, CO2, BUN, CA, PHOS, MG in the last 72 hours. No lab exists for component: CREATININE, GLUCOSEPT/INR:No results for input(s): INR, INREXT in the last 72 hours. No lab exists for component: PROTIME  APTT:No results for input(s): APTT in the last 72 hours. LIVER PROFILE:No results for input(s): AST, ALT in the last 72 hours. No lab exists for component: BILIDIR, BILITOT, ALKPHOS  No results found for: ALT, AST, GGT, GGTP, AP, APIT, APX, CBIL, TBIL, TBILI    Imaging Last 24 Hours:  No results found.   Assessment//Plan   Active Problems:    Substance abuse ([address] (2021)      Suicide ([facility] (2021)      Depression (2021)      Assessment & Plan  Continue substance abuse group  Ongoing discussion relating to transition to place to inpatient rehab  Patient has been reluctant continue motivation safety planning  DC daytime Seroquel  Electronically signed by Elida Kessler MD on 5/11/2021 at 11:19 PM

## 2021-05-12 NOTE — BH NOTES
Behavioral Health Treatment Team Note     Patient goal(s) for today: Coordinate dc plans and inpatient substance abuse treatment   Treatment team focus/goals: group therapy, family session, dc planning, service coordination     Progress note: Pt presented with a slightly elevated affect and manic mood. Pt spoke very fast during conversation. Pt insisted that he wanted to go to his home to get his belongings. Writer informed him of the danger of relapsing when he returns home prior to completing treatment. Pt agreed that he would not go home and stated that he would do whatever the recommendation was. Pt agreed to step down to crisis after a family session tomorrow morning with his sister. Pt stated that this time 'felt different' and that he wanted to make changes to his life. Pt stated that he wanted to be happy and to live a better life. However pt still endorsed slight SI with a plan to use the weapons in the home. Pt in need of inpatient level of care to allow for safe dc planning and family session. LOS:  6  Expected LOS: 5-7    Insurance info/prescription coverage:  [employer] [employer] MEDICA[employer]/[facility]  Date of last family contact:  5/11/21  Family requesting physician contact today:  no  Discharge plan:  Inpatient substance abuse   Guns in the home:  Yes father in law has guns in the home   Outpatient provider(s):   Will be coordinated prior to [employer]    Participating treatment team members: Brenton Stallings, * (assigned SW), Lilly Wilson

## 2021-05-12 NOTE — BH NOTES
Patient BS was 448 @ 2029, physician was notified and further orders were giving. Patient received  Humalog 18 unit. His BS was recheck @ 2148 235 mg/dl. No hypo or hyperglycinemia reaction noted. Patient has been up on the unit, in the day room socializing with peers. No acute distress noted. He is compliant with medication.

## 2021-05-12 NOTE — GROUP NOTE
IP  GROUP DOCUMENTATION INDIVIDUAL Group Therapy Note Date: 5/12/2021 Group Start Time: 2878 Group End Time: 5271 Group Topic: Special Track - Drug Topic SRM 2 BEHA HLTH ACUTE Kizzy Agent, RT 
 
[facility] GROUP DOCUMENTATION GROUP Group Therapy Note Triggers Attendees: 7 Attendance: Attended Patient's Goal:  To attend groups Interventions/techniques: Informed Follows Directions: Followed directions Interactions: Interacted appropriately Mental Status: Calm Behavior/appearance: Attentive and Cooperative Goals Achieved: Able to listen to others and Able to receive feedback Additional Notes:  Pt attended group and was engaged.  
 
Eder Zhang, RT

## 2021-05-12 NOTE — GROUP NOTE
[facility] GROUP DOCUMENTATION INDIVIDUAL Group Therapy Note Date: 5/12/2021 Group Start Time: 1300 Group End Time: 3444 Group Topic: Process Group - Inpatient SRM 2  NON ACUTE Aracelis Reynaga [facility] GROUP DOCUMENTATION GROUP Group Therapy Note Attendees: 6 out of 8 
 
1pm Process Group Patients were invited to group and encouraged to share their, thoughts, feelings, and emotions. Patients were also asked to speak with the group about their goal for treatment for the day. Lastly, patients were asked to listen respectfully to and be supportive of their peers. Attendance: Attended Patient's Goal:  Develop Coping Skills Interventions/techniques: Validated, Promoted peer support, Provide feedback and Supported Follows Directions: Followed directions Interactions: Interacted appropriately Mental Status: Calm Behavior/appearance: Attentive, Caretaking, Cooperative and Motivated Goals Achieved: Able to engage in interactions, Able to listen to others, Able to give feedback to another, Able to reflect/comment on own behavior, Able to manage/cope with feelings, Able to receive feedback, Able to experience relief/decrease in symptoms, Able to self-disclose, Discussed coping, Discussed discharge plans, Discussed self-esteem issues, Discussed safety plan, Displayed empathy, Identified feelings, Identified triggers, Identified relapse prevention strategies and Verbalized increased hopefulness Additional Notes:  Pt stated that his goal for today is \"to make it to all the groups. \" Pt spoke at length about feeling that this is his \"last chance\" to get clean and off of substances as he has been using drugs for the majority of his life. Pt also spoke about not ever having known what true happiness is and that he is fearful of the unknown.  Pt also spoke about his intentions to enter into long term residential treatment and that he plans to do this immediately after discharging from the hospital. Pt was respectful and supportive toward his peers. [place]

## 2021-05-12 NOTE — BH NOTES
COLLATERAL     Writer spoke to pts sister Colt Edwards ad scheduled a family session for 1pm on 5/13. Colt Edwards stated that she wanted the pt to receive longer term treatment and this writer spoke to her about [facility] stated that she thought that was a great idea and that she would be willing to pay for the ticket.

## 2021-05-12 NOTE — BH NOTES
Writer spoke to pts sister about possible to to [facility] tomorrow. She stated that she would prefer Friday so that she would be able to get him belongings and have time to find gas due to the gas shortage. She stated that she would purchase the ticket online and pay for a cab if necessary on Friday. Writer will follow up.

## 2021-05-12 NOTE — GROUP NOTE
[facility] GROUP DOCUMENTATION INDIVIDUAL Group Therapy Note Date: 5/12/2021 Group Start Time: 1100 Group End Time: 6987 Group Topic: Education Group - Inpatient [facility] 2  NON ACUTE Shawnee Tillman [facility] GROUP DOCUMENTATION GROUP Group Therapy Note Attendees: 5 out of 8 
 
11am Psycho-Education Group Topic: Suicide Safety Planning Patients were educated on suicide safety planning and how to complete a plan of their own that they can use in times of crisis. Patients were asked to share their responses openly while in group. Lastly, the patients were asked to listen respectfully to and be supportive of their peers. Attendance: Attended Patient's Goal: Develop and discuss their own Suicide Safety Plan that will keep them safe in times of emotional crisis. Interventions/techniques: Informed, Validated, Promoted peer support, Provide feedback, Reinforced and Supported Follows Directions: Followed directions Interactions: Interacted appropriately Mental Status: Calm Behavior/appearance: Attentive and Cooperative Goals Achieved: Able to engage in interactions, Able to listen to others, Able to give feedback to another, Able to reflect/comment on own behavior, Able to manage/cope with feelings, Able to receive feedback, Able to self-disclose, Discussed coping, Discussed discharge plans, Discussed safety plan, Displayed empathy, Identified feelings, Identified triggers and Identified relapse prevention strategies Additional Notes:  Pt was calm and appropriate while in group. He was respectful toward peers and completed his safety plan. Pt voiced not having a lot of positive coping skills or social supports other than his sister at this time. [place]

## 2021-05-12 NOTE — BH NOTES
Patient alert and oriented, pleasant upon approach, affect wnl, good eye contact. Patient was medication compliant. He did c/o sciatic pain, he denied SI, HI, AH, VH and rated depression at a 7 and anxiety at an 8 on a scale of 0-10. He said he was anxious and depressed for family reasons, living situations and his Diabetes. Patient said he does fantasize about suicide, but denied having plans and verbally contracted for safety. Patient remains on close observation, Q 15 minute checks.

## 2021-05-13 LAB
GLUCOSE BLD STRIP.AUTO-MCNC: 198 MG/DL (ref 65–117)
GLUCOSE BLD STRIP.AUTO-MCNC: 228 MG/DL (ref 65–117)
GLUCOSE BLD STRIP.AUTO-MCNC: 257 MG/DL (ref 65–117)
GLUCOSE BLD STRIP.AUTO-MCNC: 436 MG/DL (ref 65–117)
PERFORMED BY, TECHID: ABNORMAL

## 2021-05-13 PROCEDURE — 74011250637 HC RX REV CODE- 250/637: Performed by: PSYCHIATRY & NEUROLOGY

## 2021-05-13 PROCEDURE — 65220000003 HC RM SEMIPRIVATE PSYCH

## 2021-05-13 PROCEDURE — 74011250637 HC RX REV CODE- 250/637: Performed by: INTERNAL MEDICINE

## 2021-05-13 PROCEDURE — 74011636637 HC RX REV CODE- 636/637: Performed by: INTERNAL MEDICINE

## 2021-05-13 PROCEDURE — 82962 GLUCOSE BLOOD TEST: CPT

## 2021-05-13 RX ORDER — METFORMIN HYDROCHLORIDE 500 MG/1
1000 TABLET ORAL 2 TIMES DAILY WITH MEALS
Status: DISCONTINUED | OUTPATIENT
Start: 2021-05-14 | End: 2021-05-14 | Stop reason: HOSPADM

## 2021-05-13 RX ORDER — MAGNESIUM SULFATE 100 %
4 CRYSTALS MISCELLANEOUS AS NEEDED
Status: DISCONTINUED | OUTPATIENT
Start: 2021-05-13 | End: 2021-05-14 | Stop reason: HOSPADM

## 2021-05-13 RX ORDER — INSULIN GLARGINE 100 [IU]/ML
10 INJECTION, SOLUTION SUBCUTANEOUS DAILY
Status: DISCONTINUED | OUTPATIENT
Start: 2021-05-14 | End: 2021-05-14 | Stop reason: HOSPADM

## 2021-05-13 RX ORDER — DEXTROSE 50 % IN WATER (D50W) INTRAVENOUS SYRINGE
25-50 AS NEEDED
Status: DISCONTINUED | OUTPATIENT
Start: 2021-05-13 | End: 2021-05-14 | Stop reason: HOSPADM

## 2021-05-13 RX ADMIN — QUETIAPINE FUMARATE 300 MG: 300 TABLET ORAL at 21:33

## 2021-05-13 RX ADMIN — INSULIN LISPRO 15 UNITS: 100 INJECTION, SOLUTION INTRAVENOUS; SUBCUTANEOUS at 12:51

## 2021-05-13 RX ADMIN — METFORMIN HYDROCHLORIDE 500 MG: 500 TABLET ORAL at 08:31

## 2021-05-13 RX ADMIN — THIAMINE HCL TAB 100 MG 100 MG: 100 TAB at 08:31

## 2021-05-13 RX ADMIN — CHLORDIAZEPOXIDE HYDROCHLORIDE 10 MG: 10 CAPSULE ORAL at 15:26

## 2021-05-13 RX ADMIN — FOLIC ACID 1 MG: 1 TABLET ORAL at 08:31

## 2021-05-13 RX ADMIN — BUPROPION HYDROCHLORIDE 100 MG: 100 TABLET, EXTENDED RELEASE ORAL at 08:31

## 2021-05-13 RX ADMIN — METFORMIN HYDROCHLORIDE 500 MG: 500 TABLET ORAL at 17:56

## 2021-05-13 RX ADMIN — INSULIN LISPRO 6 UNITS: 100 INJECTION, SOLUTION INTRAVENOUS; SUBCUTANEOUS at 21:32

## 2021-05-13 RX ADMIN — CHLORDIAZEPOXIDE HYDROCHLORIDE 10 MG: 10 CAPSULE ORAL at 08:31

## 2021-05-13 RX ADMIN — CHLORDIAZEPOXIDE HYDROCHLORIDE 10 MG: 10 CAPSULE ORAL at 21:33

## 2021-05-13 RX ADMIN — INSULIN LISPRO 3 UNITS: 100 INJECTION, SOLUTION INTRAVENOUS; SUBCUTANEOUS at 08:29

## 2021-05-13 NOTE — BH NOTES
Pt.is up and visible on unit, appears anxious,denies feeling suicidal or depressed. pt.expresses concern over going into rehab for 90 days states he feels like he needs it. Also worries about his diabetes, pt. Education done. pt.very receptive to diabetic teaching.pt.denies hallucinations. no other concerns voiced. remains on close observation.

## 2021-05-13 NOTE — GROUP NOTE
[facility] GROUP DOCUMENTATION INDIVIDUAL Group Therapy Note Date: 5/12/2021 Group Start Time: 1900 Group End Time: 2000 Group Topic: Recreational/Music Therapy SRM 2  NON ACUTE Devaughn Docker [facility] GROUP DOCUMENTATION GROUP Group Therapy Note Attendees:5 Introduce healthy leisure task skill as positive way to manage mood. Attendance: Attended Patient's Goal:  STG: Attends activities and groups Interventions/techniques: Art integration and Supported Follows Directions: Followed directions Interactions: Interacted appropriately Mental Status: Calm and Flat Behavior/appearance: Attentive and Cooperative Goals Achieved: Able to engage in interactions and Able to listen to others Additional Notes: Attended group. Accepted leisure packet. Selected songs to listen to in group. Was receptive to intervention and interacted with staff and peers. Used leisure time constructively.   
 
Florentin Michelle, DEREJES

## 2021-05-13 NOTE — BH NOTES
Behavioral Health Transition Record to Provider    Patient Name: Jorge Julio  YOB: 1975  Medical Record Number: [id number]  Date of Admission: 5/6/2021  Date of Discharge: 5/14/21    Attending Provider: Shmuel Sood MD  Discharging Provider: Dr. Africa Chau   To contact this individual call [phone] and ask the  to page. If unavailable, ask to be transferred to [address] [place] [address] Provider on call. [facility] Provider will be available on call 24/7 and during holidays. Primary Care Provider: None    No Known Allergies    Reason for Admission: Pt was admitted for SI and RICHARD     Admission Diagnosis: Suicide ([facility] [address] [address] Emi.[place]. 8XXA]  Depression [F32.9]  Substance abuse ([facility] [address] [address] [F19.10]    * No surgery found *    Results for orders placed or performed during the hospital encounter of 05/06/21   GLUCOSE, POC   Result Value Ref Range    Glucose (POC) 363 (H) 65 - 100 mg/dL    Performed by [facility], POC   Result Value Ref Range    Glucose (POC) 236 (H) 65 - 100 mg/dL    Performed by Lul Solitario    GLUCOSE, POC   Result Value Ref Range    Glucose (POC) 305 (H) 65 - 100 mg/dL    Performed by Lul Solitario    GLUCOSE, POC   Result Value Ref Range    Glucose (POC) 300 (H) 65 - 100 mg/dL    Performed by Dov Marvin    GLUCOSE, POC   Result Value Ref Range    Glucose (POC) 217 (H) 65 - 100 mg/dL    Performed by [email or web address], POC   Result Value Ref Range    Glucose (POC) 318 (H) 65 - 100 mg/dL    Performed by David Quiñonez    GLUCOSE, POC   Result Value Ref Range    Glucose (POC) 292 (H) 65 - 100 mg/dL    Performed by [email or web address], POC   Result Value Ref Range    Glucose (POC) 300 (H) 65 - 100 mg/dL    Performed by Dov Marvin    GLUCOSE, POC   Result Value Ref Range    Glucose (POC) 243 (H) 65 - 100 mg/dL    Performed by David Quiñonez    GLUCOSE, POC   Result Value Ref Range    Glucose (POC) 271 (H) 65 - 100 mg/dL    Performed by David Quiñonez GLUCOSE, POC   Result Value Ref Range    Glucose (POC) 309 (H) 65 - 100 mg/dL    Performed by [employer] Tobias    GLUCOSE, POC   Result Value Ref Range    Glucose (POC) 323 (H) 65 - 100 mg/dL    Performed by [employer]    GLUCOSE, POC   Result Value Ref Range    Glucose (POC) 326 (H) 65 - 100 mg/dL    Performed by [address], POC   Result Value Ref Range    Glucose (POC) 348 (H) 65 - 100 mg/dL    Performed by [address], POC   Result Value Ref Range    Glucose (POC) 328 (H) 65 - 100 mg/dL    Performed by [address], POC   Result Value Ref Range    Glucose (POC) 388 (H) 65 - 100 mg/dL    Performed by [facility], POC   Result Value Ref Range    Glucose (POC) 321 (H) 65 - 117 mg/dL    Performed by Imelda Anaya    GLUCOSE, POC   Result Value Ref Range    Glucose (POC) 397 (H) 65 - 117 mg/dL    Performed by Imelda Anaya    GLUCOSE, POC   Result Value Ref Range    Glucose (POC) 397 (H) 65 - 117 mg/dL    Performed by Imelda Anaya    GLUCOSE, POC   Result Value Ref Range    Glucose (POC) 448 (H) 65 - 117 mg/dL    Performed by Marisol Arciniega    GLUCOSE, POC   Result Value Ref Range    Glucose (POC) 235 (H) 65 - 117 mg/dL    Performed by Katharina Boone ([employer])    GLUCOSE, POC   Result Value Ref Range    Glucose (POC) 203 (H) 65 - 117 mg/dL    Performed by Marisol Arciniega    GLUCOSE, POC   Result Value Ref Range    Glucose (POC) 262 (H) 65 - 117 mg/dL    Performed by Juvenal Waddell    GLUCOSE, POC   Result Value Ref Range    Glucose (POC) 399 (H) 65 - 117 mg/dL    Performed by Juvenal Waddell    GLUCOSE, POC   Result Value Ref Range    Glucose (POC) 400 (H) 65 - 117 mg/dL    Performed by Juvenal Waddell    GLUCOSE, POC   Result Value Ref Range    Glucose (POC) 398 (H) 65 - 117 mg/dL    Performed by Juvenal Waddell    GLUCOSE, POC   Result Value Ref Range    Glucose (POC) 368 (H) 65 - 117 mg/dL    Performed by Katharina Boone ([employer])    GLUCOSE, POC Result Value Ref Range    Glucose (POC) 216 (H) 65 - 117 mg/dL    Performed by Carlito GRUBER Mitchell)    GLUCOSE, POC   Result Value Ref Range    Glucose (POC) 198 (H) 65 - 117 mg/dL    Performed by [facility]    GLUCOSE, POC   Result Value Ref Range    Glucose (POC) 436 (H) 65 - 117 mg/dL    Performed by [facility]        Immunizations administered during this encounter: There is no immunization history on file for this patient. Screening for Metabolic Disorders for Patients on Antipsychotic Medications  (Data obtained from the EMR)    Estimated Body Mass Index  There is no height or weight on file to calculate BMI. Vital Signs/Blood Pressure  Visit Vitals  /70   Pulse 88   Temp 97.6 °F (36.4 °C)   Resp 18       Blood Glucose/Hemoglobin A1c  Lab Results   Component Value Date/Time    Glucose (POC) 436 (H) 05/13/2021 11:56 AM       No results found for: HBA1C, HGBE8, IGQ7YNXQ     Lipid Panel  No results found for: CHOL, CHOLX, CHLST, CHOLV, 069745, HDL, HDLP, LDL, LDLC, DLDLP, TGLX, TRIGL, TRIGP, CHHD, CHHDX     Discharge Diagnosis: Suicide ([facility] [place]. 8XXA]  Depression [F32.9]  Substance abuse ([facility] [F19.10]    Discharge Plan: Pt will be discharging to inpatient substance abuse treatment at [facility] in [place]     Discharge Medication List and Instructions: There are no discharge medications for this patient. Unresulted Labs (24h ago, onward)    None        To obtain results of studies pending at discharge, please contact     Follow-up Information     Follow up With Specialties Details Why Elis Hall Board  Schedule an appointment as soon as possible for a visit [phone] [facility]  [phone]          Advanced Directive:   Does the patient have an appointed surrogate decision maker? No  Does the patient have a Medical Advance Directive? No  Does the patient have a Psychiatric Advance Directive?  No  If the patient does not have a surrogate or Medical Advance Directive AND Psychiatric Advance Directive, the patient was offered information on these advance directives Yes    Patient Instructions: Please continue all medications until otherwise directed by physician. Tobacco Cessation Discharge Plan:   Is the patient a smoker and needs referral for smoking cessation? Refused  Patient referred to the following for smoking cessation with an appointment? Refused     Patient was offered medication to assist with smoking cessation at discharge? Refused  Was education for smoking cessation added to the discharge instructions? Refused    Alcohol/Substance Abuse Discharge Plan:   Does the patient have a history of substance/alcohol abuse and requires a referral for treatment? Yes  Patient referred to the following for substance/alcohol abuse treatment with an appointment? Yes  Patient was offered medication to assist with alcohol cessation at discharge? Yes  Was education for substance/alcohol abuse added to discharge instructions?  Yes    Patient discharged to Inpatient facility; discussed with the receiving facility  Primary physician, other healthcare professional, or site designated for follow up care

## 2021-05-13 NOTE — GROUP NOTE
[facility] GROUP DOCUMENTATION INDIVIDUAL Group Therapy Note Date: 5/13/2021 Group Start Time: 1300 Group End Time: 1400 Group Topic: Recreational/Music Therapy SRM 2  NON ACUTE Durward Polite [facility] GROUP DOCUMENTATION GROUP Group Therapy Note Facilitated leisure skills group focused on positive coping skills through social interactions, group activities, music and art tasks Attendees: 5/10 Attendance: Attended Patient's Goal:  *STG: Attends activities and groups Interventions/techniques: Art integration and Supported Follows Directions: Followed directions Interactions: Interacted appropriately Mental Status: Calm Behavior/appearance: Cooperative Goals Achieved: Able to engage in interactions and Able to listen to others Additional Notes:  Pt was receptive to music and songs he selected in group. Pt declined to work on leisure packet but was observed interacting with peers. Britney Rosado, RT Intern

## 2021-05-13 NOTE — PROGRESS NOTES
Patient alert and oriented x4. Patient denies si/hi/avh. Patient states [facility] is 8/10 [facility] Patient denies depression. Patient is in milieu interacting with peers and attending groups. Patient is smiling and has a pleasant mood. Patient is medication compliant, calm and cooperative at this time. Will continue to monitor.

## 2021-05-13 NOTE — PROGRESS NOTES
Problem: Suicide  Goal: *STG: Remains safe in hospital  Outcome: Progressing Towards Goal  Goal: *STG: Seeks staff when feelings of self harm or harm towards others arise  Outcome: Progressing Towards Goal  Goal: *STG: Attends activities and groups  Outcome: Progressing Towards Goal  Goal: *STG:  Verbalizes alternative ways of dealing with maladaptive feelings/behaviors  Outcome: Progressing Towards Goal  Goal: *STG/LTG: Complies with medication therapy  Outcome: Progressing Towards Goal  Goal: *STG/LTG: No longer expresses self destructive or suicidal thoughts  Outcome: Progressing Towards Goal  Goal: *LTG:  Identifies available community resources  Outcome: Progressing Towards Goal  Goal: *LTG:  Develops proactive suicide prevention plan  Outcome: Progressing Towards Goal  Goal: Interventions  Outcome: Progressing Towards Goal     Problem: Patient Education: Go to Patient Education Activity  Goal: Patient/Family Education  Outcome: Progressing Towards Goal     Problem: Falls - Risk of  Goal: *Absence of Falls  Description: Document Jeane Fall Risk and appropriate interventions in the flowsheet.   Outcome: Progressing Towards Goal  Note: Fall Risk Interventions:            Medication Interventions: Teach patient to arise slowly                   Problem: Patient Education: Go to Patient Education Activity  Goal: Patient/Family Education  Outcome: Progressing Towards Goal

## 2021-05-13 NOTE — BH NOTES
Writer contacted pts sister to confirm grey [facility] bus ticket. She stated that she was purchasing a ticket and that it would be for 1pm on 5/14/21, the ticket information was sent to [email or web address]. Writer set up a fast cab to  the pt at noon on 5/14/21.

## 2021-05-13 NOTE — PROGRESS NOTES
Progress Note  Date:2021       Room:[facility]  Patient Name:Joe Blair     YOB: 1975     Age:46 y.o. Subjective    Subjective:  Symptoms:  Stable. He reports anxiety. No shortness of breath or cough. Diet:  Adequate intake. Activity level: Normal.       Review of Systems   Respiratory: Negative for cough and shortness of breath. Psychiatric/Behavioral: Positive for suicidal ideas. Negative for agitation, behavioral problems, confusion, dysphoric mood, hallucinations, self-injury and sleep disturbance. Objective         Vitals Last 24 Hours:  TEMPERATURE:  Temp  Av.1 °F (36.7 °C)  Min: 97.8 °F (36.6 °C)  Max: 98.4 °F (36.9 °C)  RESPIRATIONS RANGE: Resp  Av  Min: 18  Max: 18  PULSE OXIMETRY RANGE: No data recorded  PULSE RANGE: Pulse  Av  Min: 83  Max: 101  BLOOD PRESSURE RANGE: Systolic (64ZPR), RLK:172 , Min:101 , UPF:160   ; Diastolic (34NYF), BUX:24, Min:65, Max:78    I/O (24Hr): No intake or output data in the 24 hours ending 21 0036  Objective:  General Appearance:  Comfortable. Vital signs: (most recent): Blood pressure 114/78, pulse (!) 101, temperature 98.4 °F (36.9 °C), resp. rate 18. Vital signs are normal.    Neurological: Patient is alert and oriented to person, place and time. Labs/Imaging/Diagnostics    Labs:  CBC:No results for input(s): WBC, RBC, HGB, HCT, MCV, RDW, PLT, HGBEXT, HCTEXT, PLTEXT in the last 72 hours. CHEMISTRIES:No results for input(s): NA, K, CL, CO2, BUN, CA, PHOS, MG in the last 72 hours. No lab exists for component: CREATININE, GLUCOSEPT/INR:No results for input(s): INR, INREXT in the last 72 hours. No lab exists for component: PROTIME  APTT:No results for input(s): APTT in the last 72 hours. LIVER PROFILE:No results for input(s): AST, ALT in the last 72 hours.     No lab exists for component: BILIDIR, BILITOT, ALKPHOS  No results found for: ALT, AST, GGT, GGTP, AP, APIT, APX, CBIL, TBIL, TBILI    Imaging Last 24 Hours:  No results found. Assessment//Plan   Active Problems:    Substance abuse ([facility] (5/7/2021)      Suicide ([facility] (5/7/2021)      Depression (5/7/2021)      Assessment:    Condition: In stable condition.        Plan:   Transfer to step-down unit.    rehab/ crisis stab  Diabetic edu, consult    Current Facility-Administered Medications:     metFORMIN (GLUCOPHAGE) tablet 500 mg, 500 mg, Oral, BID WITH MEALS, Kermit Vides MD, 500 mg at 05/12/21 1629    alum-mag hydroxide-simeth (MYLANTA) oral suspension 30 mL, 30 mL, Oral, Q4H PRN, Sita Moran MD, 30 mL at 05/11/21 2308    buPROPion SR (WELLBUTRIN SR) tablet 100 mg, 100 mg, Oral, DAILY, Sita Moran MD, 100 mg at 05/12/21 0849    chlordiazePOXIDE (LIBRIUM) capsule 10 mg, 10 mg, Oral, TID, Sita Moran MD, 10 mg at 05/12/21 2112    hydrOXYzine HCL (ATARAX) tablet 50 mg, 50 mg, Oral, TID PRN, Ace Dawkins MD, 50 mg at 05/12/21 1208    traZODone (DESYREL) tablet 50 mg, 50 mg, Oral, QHS PRN, Ace Dawkins MD, 50 mg at 05/07/21 0109    acetaminophen (TYLENOL) tablet 650 mg, 650 mg, Oral, Q4H PRN, Sita Moran MD    magnesium hydroxide (MILK OF MAGNESIA) 400 mg/5 mL oral suspension 30 mL, 30 mL, Oral, DAILY PRN, Sita Moran MD    nicotine (NICODERM CQ) 14 mg/24 hr patch 1 Patch, 1 Patch, TransDERmal, DAILY, Sita Moran MD, 1 Patch at 05/12/21 0848    ibuprofen (MOTRIN) tablet 600 mg, 600 mg, Oral, Q6H PRN, Sita Moran MD, 600 mg at 05/12/21 0849    thiamine mononitrate (B-1) tablet 100 mg, 100 mg, Oral, DAILY, Sita Moran MD, 100 mg at 07/94/67 5382    folic acid (FOLVITE) tablet 1 mg, 1 mg, Oral, DAILY, Sita Moran MD, 1 mg at 05/12/21 0849    QUEtiapine (SEROquel) tablet 300 mg, 300 mg, Oral, QHS, Sita Moran MD, 300 mg at 05/12/21 2112    insulin lispro (HUMALOG) injection, , SubCUTAneous, AC&HS, Wyatt GREENBERG MD, 15 Units at 05/12/21 2112    glucose chewable tablet 16 g, 4 Tab, Oral, PRN, Breanne Mcdonaldon Pipes, MD    glucagon (GLUCAGEN) injection 1 mg, 1 mg, IntraMUSCular, PRN, Shelly Menezes MD    Electronically signed by Radha Nolan MD on 5/13/2021 at 12:36 AM

## 2021-05-13 NOTE — GROUP NOTE
[facility]  GROUP DOCUMENTATION INDIVIDUAL Group Therapy Note Date: 5/13/2021 Group Start Time: 4962 Group End Time: 0222 Group Topic: Special Track - Drug Topic SRM 2 [facility] ACUTE RT Jo 
 
[facility] GROUP DOCUMENTATION GROUP Group Therapy Note Addictive Agents Attendees: 9 Attendance: Attended Patient's Goal:  To attend groups Interventions/techniques: Informed Follows Directions: Followed directions Interactions: Interacted appropriately Mental Status: Calm Behavior/appearance: Attentive and Cooperative Goals Achieved: Able to listen to others and Able to receive feedback Additional Notes:  Pt attended group and participated in group discussion.  
 
Elvis Santiago, RT

## 2021-05-13 NOTE — GROUP NOTE
IP  GROUP DOCUMENTATION INDIVIDUAL Group Therapy Note Date: 5/13/2021 Group Start Time: 1100 Group End Time: 4462 Group Topic: Education Group - Inpatient SRM 2 BH NON ACUTE Courtney Campos [facility] GROUP DOCUMENTATION GROUP Group Therapy Note Facilitated group discussion focused on characteristics of healthy relationships and patients were able to identify which characteristics applied to their own relationships Attendees: 5/9 Attendance: Attended Patient's Goal:  *STG/LTG: No longer expresses self destructive or suicidal thoughts Interventions/techniques: Informed and Supported Follows Directions: Followed directions Interactions: Interacted appropriately Mental Status: Calm Behavior/appearance: Attentive and Cooperative Goals Achieved: Able to engage in interactions, Able to listen to others and Able to reflect/comment on own behavior Additional Notes:  Pt was receptive to intervention discussion. Pt reported having individuality, openness to constructive feedback, and taking responsibility for his own actions are characteristics that are present in his relationship. Susie Osorio, RT Intern

## 2021-05-13 NOTE — BH NOTES
Behavioral Health Treatment Team Note     Patient goal(s) for today: 'to get everything figured out for my dc tomorrow'   Treatment team focus/goals: group therapy, medication management     Progress note: Pt presented with a broad affect and congruent mood. Pt expressed that he was excited and ready to dc to [facility] tomorrow. Pt stated that his father in law was going to be brining his clothing to him and enquired about getting money for the trip to [place]. Writer informed him that he would need to speak to his sister in regards to funding. Pt stated that he was feeling anxiety about leaving. Pt denies any SI/HI/AVH at this time. Pt in need of inpatient level of care to allow for safe dc planning. LOS:  7  Expected LOS: 5-7    Insurance info/prescription coverage:  [place] [employer] [employer]/[facility]  Date of last family contact:  5/12/21  Family requesting physician contact today:  no  Discharge plan:  Inpatient Substance abuse program   Guns in the home:  Yes father in law has guns in the home   Outpatient provider(s):   Will be coordinated prior to [employer]     Participating treatment team members: Bryson Alexandra, * (assigned SW), Maricarmen Gordillo

## 2021-05-13 NOTE — GROUP NOTE
[facility] GROUP DOCUMENTATION INDIVIDUAL Group Therapy Note Date: 5/13/2021 Group Start Time: 1500 Group End Time: 5097 Group Topic: Process Group - Inpatient SRM 2  NON ACUTE Hannah Cruz [facility] GROUP DOCUMENTATION GROUP Group Therapy Note Attendees: 6 out off 10 
 
1pm Process Group Patients were invited to group and encouraged to discuss their thoughts, feelings, and emotions. Patients were also asked to be respectful of the boundaries that were set forth in the group so as to maintain a safe space for everyone. Lastly, the patients were asked to listen respectfully to and be supportive of their peers. Attendance: Attended Patient's Goal:  Develop Coping Skills Interventions/techniques: Validated, Promoted peer support, Provide feedback, Reinforced and Supported Follows Directions: Followed directions Interactions: Interacted appropriately Mental Status: Calm Behavior/appearance: Attentive and Cooperative Goals Achieved: Able to engage in interactions, Able to listen to others, Able to give feedback to another, Able to reflect/comment on own behavior, Able to manage/cope with feelings, Able to receive feedback, Able to self-disclose, Discussed coping, Discussed discharge plans, Discussed self-esteem issues, Displayed empathy, Identified feelings, Identified triggers and Identified relapse prevention strategies Additional Notes:  Pt spoke about feeling somewhat discouraged about his soon to be departure from the hospital and his trip to rehab [place] another state. Pt stated that he needs some individuals help in order to get everything in order for him to leave and that thus far he has not been able to get in touch with them.  Pt also spoke about not having any shoes to wear and the fact that he is uncomfortable to wear the shoes that the hospital clothing closet provided him. Pt allowed his peers to make suggestions on how he can overcome these problems and still go onto the rehabilitation. Pt was open to the suggestions and stated that he would be going to treatment no matter what because he feels that this is his last chance to get clean and turn his life around. [place]

## 2021-05-13 NOTE — BH NOTES
DISCHARGE SUMMARY    NAME:Joe Ying  : 1975  MRN: [id number]    The patient Evelyn Olson exhibits the ability to control behavior in a less restrictive environment. Patient's level of functioning is improving. No assaultive/destructive behavior has been observed for the past 24 hours. No suicidal/homicidal threat or behavior has been observed for the past 24 hours. There is no evidence of serious medication side effects. Patient has not been in physical or protective restraints for at least the past 24 hours. If weapons involved, how are they secured? No weapons involved     Is patient aware of and in agreement with discharge plan? Yes     Arrangements for medication:  Prescriptions given to patient, given a weeks supply or 30 day supply. Copy of discharge instructions to provider?:  Yes     Arrangements for transportation home:  Pt will be taking transportation to [facility] in [place] all follow up appointments as scheduled, continue to take prescribed medications per physician instructions.   Mental health crisis number:  498 or your local mental health crisis line number at [address] [facility] Emergency WARM LINE      [phone] ([phone])      M-F: 9am to 9pm      Sat & Sun: 5pm  9pm  National suicide prevention lines:                             6-816-HSZKOPW ([phone])       5-790-840-TALK ([phone])    Crisis Text Line:  Text HOME to [phone]

## 2021-05-13 NOTE — GROUP NOTE
IP  GROUP DOCUMENTATION INDIVIDUAL Group Therapy Note Date: 5/13/2021 Group Start Time: 1400 Group End Time: 1500 Group Topic: Special Track - Drug Topic SRM 2 [facility] ACUTE Jayson Winston, RT 
 
[facility] GROUP DOCUMENTATION GROUP Group Therapy Note Protracted Withdrawal 
 
Attendees: 11 Attendance: Attended Patient's Goal:  To attend groups Interventions/techniques: Informed Follows Directions: Followed directions Interactions: Interacted appropriately Mental Status: Calm Behavior/appearance: Attentive and Cooperative Goals Achieved: Able to listen to others and Able to receive feedback Additional Notes:  Pt attended group and was engaged.  
 
Kenisha Wilson, RT

## 2021-05-13 NOTE — CONSULTS
Nutrition Education    · Verbally reviewed information with Patient  · Educated on DM nutrition therapy  · Written educational materials provided-Carb counting for ppl with DM, MyPlate handout  · Contact name and number provided. · Refer to Patient Education activity for more details. RD spoke to pt at bedside regarding DM nutrition therapy. Pt reports did not follow a DM diet PTA. Stated that he did not eat at all some days 2/2 drug use. Reports he does not eat sweets. Thinks he lost 10 lbs x5 months since he was not eating enough. Reports he is somewhat food insecure and does not have access to fresh foods daily. Reports he was not taking care of his health at all or checking his BG PTA. Current -450 mg/dL. Reports he has hope now and wants to improve his wellbeing and overall health. Stated that he almost killed himself but called 911. Pt states to be d/c to rehab 5/14. Reports some nerve tingly in foot and leg. Pt wanted to know if changing his diet would improve symptoms, RD discussed importance of following DM diet to manage BG and reduce risk of neuropathy. RD reviewed cho foods and BG impacts. Reviewed protein impacts on BG and sources. Discussed importance of pairing protein and cho for glucose management. Encouraged pt to look into CGM and check BG daily once d/c. Reviewed importance of listening to body's cues to for signs of hypo/hyperglycemia. RD reviewed foods to increase and limit for optimal BG. Encouraged pt to limit SSB and have no more than x1 cup/d. Reviewed balanced meals as evidenced by MyPlate. RD gave pt contact info and encouraged to reach out with further questions or concerns.      Electronically signed by Anita Vasquez RD on 5/13/2021 at 2:34 PM    Contact Number: Ext [phone]

## 2021-05-14 VITALS
OXYGEN SATURATION: 100 % | DIASTOLIC BLOOD PRESSURE: 74 MMHG | RESPIRATION RATE: 18 BRPM | TEMPERATURE: 97.5 F | HEART RATE: 94 BPM | SYSTOLIC BLOOD PRESSURE: 106 MMHG

## 2021-05-14 LAB
GLUCOSE BLD STRIP.AUTO-MCNC: 278 MG/DL (ref 65–117)
PERFORMED BY, TECHID: ABNORMAL

## 2021-05-14 PROCEDURE — 74011636637 HC RX REV CODE- 636/637: Performed by: INTERNAL MEDICINE

## 2021-05-14 PROCEDURE — 74011250637 HC RX REV CODE- 250/637: Performed by: PSYCHIATRY & NEUROLOGY

## 2021-05-14 PROCEDURE — 74011250637 HC RX REV CODE- 250/637: Performed by: INTERNAL MEDICINE

## 2021-05-14 PROCEDURE — 82962 GLUCOSE BLOOD TEST: CPT

## 2021-05-14 RX ORDER — METFORMIN HYDROCHLORIDE 1000 MG/1
1000 TABLET ORAL 2 TIMES DAILY WITH MEALS
Qty: 60 TAB | Refills: 0 | Status: SHIPPED | OUTPATIENT
Start: 2021-05-14

## 2021-05-14 RX ORDER — TRAZODONE HYDROCHLORIDE 50 MG/1
50 TABLET ORAL
Qty: 30 TAB | Refills: 0 | Status: SHIPPED | OUTPATIENT
Start: 2021-05-14

## 2021-05-14 RX ORDER — INSULIN LISPRO 100 [IU]/ML
INJECTION, SOLUTION INTRAVENOUS; SUBCUTANEOUS
Qty: 1 VIAL | Refills: 0 | Status: SHIPPED
Start: 2021-05-14

## 2021-05-14 RX ORDER — QUETIAPINE FUMARATE 300 MG/1
300 TABLET, FILM COATED ORAL
Qty: 30 TAB | Refills: 0 | Status: SHIPPED | OUTPATIENT
Start: 2021-05-14

## 2021-05-14 RX ORDER — BUPROPION HYDROCHLORIDE 100 MG/1
100 TABLET, EXTENDED RELEASE ORAL DAILY
Qty: 30 TAB | Refills: 0 | Status: SHIPPED | OUTPATIENT
Start: 2021-05-15

## 2021-05-14 RX ORDER — INSULIN GLARGINE 100 [IU]/ML
INJECTION, SOLUTION SUBCUTANEOUS
Qty: 1 VIAL | Refills: 0 | Status: SHIPPED | OUTPATIENT
Start: 2021-05-15

## 2021-05-14 RX ADMIN — BUPROPION HYDROCHLORIDE 100 MG: 100 TABLET, EXTENDED RELEASE ORAL at 08:20

## 2021-05-14 RX ADMIN — INSULIN GLARGINE 10 UNITS: 100 INJECTION, SOLUTION SUBCUTANEOUS at 08:21

## 2021-05-14 RX ADMIN — METFORMIN HYDROCHLORIDE 1000 MG: 500 TABLET ORAL at 07:45

## 2021-05-14 RX ADMIN — THIAMINE HCL TAB 100 MG 100 MG: 100 TAB at 08:20

## 2021-05-14 RX ADMIN — INSULIN LISPRO 9 UNITS: 100 INJECTION, SOLUTION INTRAVENOUS; SUBCUTANEOUS at 07:46

## 2021-05-14 RX ADMIN — FOLIC ACID 1 MG: 1 TABLET ORAL at 08:20

## 2021-05-14 NOTE — DISCHARGE SUMMARY
PSYCHIATRIC DISCHARGE SUMMARY         IDENTIFICATION:    Patient Name  Darryl Juan   Date of Birth 1975   [facility] [id number]   Medical Record Number  [id number]      Age  42 y.o. PCP None   Admit date:  5/6/2021    Discharge date: 5/14/2021   Room Number  243/01  @ [facility]   Date of Service  5/14/2021            TYPE OF DISCHARGE: REGULAR               CONDITION AT DISCHARGE: stable       PROVISIONAL & DISCHARGE DIAGNOSES:    Major depressive disorder, moderate recurrent  Heroin abuse cocaine abuse ethanol abuse       CC & HISTORY OF PRESENT ILLNESS:  HISTORY OF PRESENT ILLNESS:  The patient is a 45-year-old admitted to the behavioral health floor on 2-South after the patient presenting to the emergency department with depression, suicidal ideation and substance abuse. The patient is a transfer from [facility] Emergency Department. The patient was brought to the ED by EMS. It was noted that the patient has a long history of cocaine use, heroin abuse and ethanol abuse and has multiple track marks in bilateral arms.     Information is obtained from review of electronic records, talking to behavioral intake staff, talking to the patient, and information was obtained from the emergency department at [facility]. The patient this morning reports that he was suicidal and currently is suicidal.  The patient is more focused on expressing going through withdrawal from the substances and not feeling well. He reports having body aches and feeling anxious. The patient reports he wants to get back on his medications for his anxiety and withdrawals. Denies any hallucinations or feeling paranoid.     Also, the patient reports depressed, feeling hopeless and helpless, poor sleep, loss of interest, lack of motivation.   He denied any active intent of suicide at this time.     The patient presented in the emergency department at [facility] with a plan to shoot himself or slit his wrist. The patient had expressed that he had been drinking increasingly and wanted to die. He expressed that he does not want to live anymore.     PAST MEDICAL HISTORY:  Includes hepatitis C, neuropathy, diabetes.     SUBSTANCE USE:  Includes ethanol, cocaine, heroin abuse. Noted bilateral track marks on his arms. SOCIAL HISTORY:    Social History     Socioeconomic History    Marital status: SINGLE     Spouse name: Not on file    Number of children: Not on file    Years of education: Not on file    Highest education level: Not on file   Occupational History    Not on file   Social Needs    Financial resource strain: Not on file    Food insecurity     Worry: Not on file     Inability: Not on file    Transportation needs     Medical: Not on file     Non-medical: Not on file   Tobacco Use    Smoking status: Not on file   Substance and Sexual Activity    Alcohol use: Not on file    Drug use: Not on file    Sexual activity: Not on file   Lifestyle    Physical activity     Days per week: Not on file     Minutes per session: Not on file    Stress: Not on file   Relationships    Social connections     Talks on phone: Not on file     Gets together: Not on file     Attends Worship service: Not on file     Active member of club or organization: Not on file     Attends meetings of clubs or organizations: Not on file     Relationship status: Not on file    Intimate partner violence     Fear of current or ex partner: Not on file     Emotionally abused: Not on file     Physically abused: Not on file     Forced sexual activity: Not on file   Other Topics Concern    Not on file   Social History Narrative    Not on file      FAMILY HISTORY:   No family history on file. HOSPITALIZATION COURSE:    Nanette Phelps was admitted to the inpatient psychiatric unit [facility] for acute psychiatric stabilization in regards to symptomatology as described in the HPI above.   While on the unit Karissa West was involved in individual, group, occupational and milieu therapy. Psychiatric medications were adjusted during this hospitalization. Karissa West demonstrated a slow, but progressive improvement in overall condition. Much of patient's depression appeared to be related to situational stressors, effects of drugs of abuse, and psychological factors. Please see individual progress notes for more specific details regarding patient's hospitalization course. At time of discharge, Karissa West is without significant problems of depression, psychosis, ann. Patient free of suicidal and homicidal ideations and reports many positive predictive factors in terms of not attempting suicide or homicide. Patient with request for discharge today. There are no grounds to seek a TDO. Patient has maximized benefit to be derived from acute inpatient psychiatric treatment. All members of the treatment team concur with each other in regards to plans for discharge today per patient's request.  Patient and family are aware and in agreement with discharge and discharge plan.          LABS AND IMAGAING:    Labs Reviewed   GLUCOSE, POC - Abnormal; Notable for the following components:       Result Value    Glucose (POC) 363 (*)     All other components within normal limits   GLUCOSE, POC - Abnormal; Notable for the following components:    Glucose (POC) 236 (*)     All other components within normal limits   GLUCOSE, POC - Abnormal; Notable for the following components:    Glucose (POC) 305 (*)     All other components within normal limits   GLUCOSE, POC - Abnormal; Notable for the following components:    Glucose (POC) 300 (*)     All other components within normal limits   GLUCOSE, POC - Abnormal; Notable for the following components:    Glucose (POC) 217 (*)     All other components within normal limits   GLUCOSE, POC - Abnormal; Notable for the following components:    Glucose (POC) 318 (*)     All other components within normal limits   GLUCOSE, POC - Abnormal; Notable for the following components:    Glucose (POC) 292 (*)     All other components within normal limits   GLUCOSE, POC - Abnormal; Notable for the following components:    Glucose (POC) 300 (*)     All other components within normal limits   GLUCOSE, POC - Abnormal; Notable for the following components:    Glucose (POC) 243 (*)     All other components within normal limits   GLUCOSE, POC - Abnormal; Notable for the following components:    Glucose (POC) 271 (*)     All other components within normal limits   GLUCOSE, POC - Abnormal; Notable for the following components:    Glucose (POC) 309 (*)     All other components within normal limits   GLUCOSE, POC - Abnormal; Notable for the following components:    Glucose (POC) 323 (*)     All other components within normal limits   GLUCOSE, POC - Abnormal; Notable for the following components:    Glucose (POC) 326 (*)     All other components within normal limits   GLUCOSE, POC - Abnormal; Notable for the following components:    Glucose (POC) 348 (*)     All other components within normal limits   GLUCOSE, POC - Abnormal; Notable for the following components:    Glucose (POC) 328 (*)     All other components within normal limits   GLUCOSE, POC - Abnormal; Notable for the following components:    Glucose (POC) 388 (*)     All other components within normal limits   GLUCOSE, POC - Abnormal; Notable for the following components:    Glucose (POC) 321 (*)     All other components within normal limits   GLUCOSE, POC - Abnormal; Notable for the following components:    Glucose (POC) 397 (*)     All other components within normal limits   GLUCOSE, POC - Abnormal; Notable for the following components:    Glucose (POC) 397 (*)     All other components within normal limits   GLUCOSE, POC - Abnormal; Notable for the following components:    Glucose (POC) 448 (*)     All other components within normal limits   GLUCOSE, POC - Abnormal; Notable for the following components:    Glucose (POC) 235 (*)     All other components within normal limits   GLUCOSE, POC - Abnormal; Notable for the following components:    Glucose (POC) 203 (*)     All other components within normal limits   GLUCOSE, POC - Abnormal; Notable for the following components:    Glucose (POC) 262 (*)     All other components within normal limits   GLUCOSE, POC - Abnormal; Notable for the following components:    Glucose (POC) 399 (*)     All other components within normal limits   GLUCOSE, POC - Abnormal; Notable for the following components:    Glucose (POC) 400 (*)     All other components within normal limits   GLUCOSE, POC - Abnormal; Notable for the following components:    Glucose (POC) 398 (*)     All other components within normal limits   GLUCOSE, POC - Abnormal; Notable for the following components:    Glucose (POC) 368 (*)     All other components within normal limits   GLUCOSE, POC - Abnormal; Notable for the following components:    Glucose (POC) 216 (*)     All other components within normal limits   GLUCOSE, POC - Abnormal; Notable for the following components:    Glucose (POC) 198 (*)     All other components within normal limits   GLUCOSE, POC - Abnormal; Notable for the following components:    Glucose (POC) 436 (*)     All other components within normal limits   GLUCOSE, POC - Abnormal; Notable for the following components:    Glucose (POC) 257 (*)     All other components within normal limits   GLUCOSE, POC - Abnormal; Notable for the following components:    Glucose (POC) 228 (*)     All other components within normal limits   GLUCOSE, POC - Abnormal; Notable for the following components:    Glucose (POC) 278 (*)     All other components within normal limits   HEMOGLOBIN A1C WITH EAG     No results found for: DS35, PHEN, PHENO, PHENT, DILF, DS39, PHENY, PTN, VALF2, VALAC, VALP, VALPR, DS6, CRBAM, CRBAMP, CARB2, XCRBAM  Admission on 05/06/2021 Component Date Value Ref Range Status    Glucose (POC) 05/06/2021 363* 65 - 100 mg/dL Final    Performed by 05/06/2021 Trudi Gardner   Final    Glucose (POC) 05/07/2021 236* 65 - 100 mg/dL Final    Performed by 05/07/2021 Thee Dam   Final    Glucose (POC) 05/07/2021 305* 65 - 100 mg/dL Final    Performed by 05/07/2021 Thee Dam   Final    Glucose (POC) 05/07/2021 300* 65 - 100 mg/dL Final    Performed by 05/07/2021 Luis Enrique Dues   Final    Glucose (POC) 05/08/2021 217* 65 - 100 mg/dL Final    Performed by 05/08/2021 Tim Coil   Final    Glucose (POC) 05/08/2021 318* 65 - 100 mg/dL Final    Performed by 05/08/2021 Daniel Begin   Final    Glucose (POC) 05/08/2021 292* 65 - 100 mg/dL Final    Performed by 05/08/2021 Tim Coil   Final    Glucose (POC) 05/08/2021 300* 65 - 100 mg/dL Final    Performed by 05/08/2021 Luis Enrique Dues   Final    Glucose (POC) 05/09/2021 243* 65 - 100 mg/dL Final    Performed by 05/09/2021 Daniel Begin   Final    Glucose (POC) 05/09/2021 271* 65 - 100 mg/dL Final    Performed by 05/09/2021 Daniel Begin   Final    Glucose (POC) 05/09/2021 309* 65 - 100 mg/dL Final    Performed by 05/09/2021 Luis Enrique Dues   Final    Glucose (POC) 05/09/2021 323* 65 - 100 mg/dL Final    Performed by 05/09/2021 Luis Enrique Dues   Final    Glucose (POC) 05/10/2021 326* 65 - 100 mg/dL Final    Performed by 05/10/2021 Lova Rout   Final    Glucose (POC) 05/10/2021 348* 65 - 100 mg/dL Final    Performed by 05/10/2021 Lova Rout   Final    Glucose (POC) 05/10/2021 328* 65 - 100 mg/dL Final    Performed by 05/10/2021 Scot Rout   Final    Glucose (POC) 05/10/2021 388* 65 - 100 mg/dL Final    Performed by 05/10/2021 Trudi Cleary   Final    Glucose (POC) 05/11/2021 321* 65 - 117 mg/dL Final    Performed by 05/11/2021 Daniel Velez   Final    Glucose (POC) 05/11/2021 397* 65 - 117 mg/dL Final    Performed by 05/11/2021 Vivien Pinna   Final    Glucose (POC) 05/11/2021 397* 65 - 117 mg/dL Final    Performed by 05/11/2021 Vivien Pinna   Final    Glucose (POC) 05/11/2021 448* 65 - 117 mg/dL Final    Performed by 05/11/2021 Zachary West   Final    Glucose (POC) 05/11/2021 235* 65 - 117 mg/dL Final    Performed by 05/11/2021 Melva Roscommon ([place])   Final    Glucose (POC) 05/11/2021 203* 65 - 117 mg/dL Final    Performed by 05/11/2021 Dameonaurora Buckneran   Final    Glucose (POC) 05/12/2021 262* 65 - 117 mg/dL Final    Performed by 05/12/2021 Birder Robinsons   Final    Glucose (POC) 05/12/2021 399* 65 - 117 mg/dL Final    Performed by 05/12/2021 Birder Robinsons   Final    Glucose (POC) 05/12/2021 400* 65 - 117 mg/dL Final    Performed by 05/12/2021 Birder Robinsons   Final    Glucose (POC) 05/12/2021 398* 65 - 117 mg/dL Final    Performed by 05/12/2021 Birder Robinsons   Final    Glucose (POC) 05/12/2021 368* 65 - 117 mg/dL Final    Performed by 05/12/2021 Melva Aime ([place])   Final    Glucose (POC) 05/12/2021 216* 65 - 117 mg/dL Final    Performed by 05/12/2021 Melva Aime ([place])   Final    Glucose (POC) 05/13/2021 198* 65 - 117 mg/dL Final    Performed by 05/13/2021 Swenson Cunas   Final    Glucose (POC) 05/13/2021 436* 65 - 117 mg/dL Final    Performed by 05/13/2021 Swenson Cunas   Final    Glucose (POC) 05/13/2021 257* 65 - 117 mg/dL Final    Performed by 05/13/2021 Swenson Cunas   Final    Glucose (POC) 05/13/2021 228* 65 - 117 mg/dL Final    Performed by 05/13/2021 Freedom Lunch   Final    Glucose (POC) 05/14/2021 278* 65 - 117 mg/dL Final    Performed by 05/14/2021 Hay Glenice   Final     No results found. DISPOSITION:    Home. Patient to f/u with drug/etoh rehabilitation, psychiatric and psychotherapy appointments. FOLLOW-UP CARE:    Activity as tolerated  Diabetic  Diet  Wound Care: none needed.   Follow-up Information     Follow up With Specialties Details Why Contact Info    formerly [facility]  Schedule an appointment as soon as possible for a visit [phone] Juanpablo Eden   [phone]    [place]   Go on 5/14/2021 Pt will dc to [place] for inpatient substance abuse treatment  [phone], [place], [address]    None    None (050) Patient stated that they have no PCP                   PROGNOSIS:   Guarded ---- based on nature of patient's pathology/ies and treatment compliance issues. Prognosis is greatly dependent upon patient's ability to remain sober and to follow up with drug/etoh rehabilitation and psychiatric/psychotherapy appointments as well as to comply with psychiatric medications as prescribed. DISCHARGE MEDICATIONS:    Informed consent given for the use of following psychotropic medications:  Current Discharge Medication List      START taking these medications    Details   buPROPion SR (WELLBUTRIN SR) 100 mg SR tablet Take 1 Tab by mouth daily. Qty: 30 Tab, Refills: 0  Start date: 5/15/2021      metFORMIN (GLUCOPHAGE) 1,000 mg tablet Take 1 Tab by mouth two (2) times daily (with meals). Qty: 60 Tab, Refills: 0  Start date: 5/14/2021      QUEtiapine (SEROquel) 300 mg tablet Take 1 Tab by mouth nightly. Indications: bipolar depression  Qty: 30 Tab, Refills: 0  Start date: 5/14/2021      traZODone (DESYREL) 50 mg tablet Take 1 Tab by mouth nightly as needed for Sleep (For insomnia). Qty: 30 Tab, Refills: 0  Start date: 5/14/2021      insulin glargine (LANTUS) 100 unit/mL injection done  Qty: 1 Vial, Refills: 0  Start date: 5/15/2021      insulin lispro (HUMALOG) 100 unit/mL injection ok  Qty: 1 Vial, Refills: 0  Start date: 5/14/2021                    Signed:  Dana Durant MD  5/14/2021   .

## 2021-05-14 NOTE — BH NOTES
Checked in on pt in regards to upcoming discharge. Gave pt his original safety plan and reviewed. Pt indicated that he was feeling \"anxious\". He was able to process these feelings appropriately. We discussed ways to cope and pt expressed feelings of some relief.

## 2021-05-14 NOTE — SUICIDE SAFETY PLAN
SAFETY PLAN    A suicide Safety Plan is a document that supports someone when they are having thoughts of suicide. Warning Signs that indicate a suicidal crisis may be developing: What (situations, thoughts, feelings, body sensations, behaviors, etc.) do you experience that lets you know you are beginning to think about suicide? 1. Sleeping, crying, using lots of drugs, being lonely  2. Anxiety  3. Feeling worthless, and hopeless      Internal Coping Strategies:  What things can I do (relaxation techniques, hobbies, physical activities, etc.) to take my mind off my problems without contacting another person? 1. Meditate  2. Work out/walk  3. Sleep    People and social settings that provide distraction: Who can I call or where can I go to distract me? 1. Name: Edna Jacobs  Phone: [phone]  2. Name: Leti Bustamante  Phone: [phone]   3. Place: Sister's house            4. Place: [address] [place] [address] whom I can ask for help: Who can I call when I need help - for example, friends, family, clergy, someone else? 1. Name: Edna Jacobs                Phone: [phone]  2. Name: Leti Bustamante  Phone: [phone]    Professionals or [facility] agencies I can contact during a crisis: Who can I call for help - for example, my doctor, my psychiatrist, my psychologist, a mental health provider, a suicide hotline? 1. Clinician Name: Devika Phone: [phone]      Clinician Pager or Emergency Contact #: [phone]    1. Clinician Name: Saw Oscar [facility])   Phone: [phone]      Clinician Pager or Emergency Contact #: [phone]    7. Suicide Prevention Lifeline: 0-442-117-TALK (1392)    4. [address] [place] [address] Emergency Services -  for example, [facility], [facility] suicide hotline, [facility] Hotline: [facility]      Emergency Services Address: [address], [address] [facility]      Emergency Services Phone: [phone]    Making the environment safe: How can I make my environment (house/apartment/living space) safer? For example, can I remove guns, medications, and other items? 1. Change envioronment  2. Being around positive people  3. Post Safety Plan where I can see it.

## 2021-05-14 NOTE — BH NOTES
Alert and oriented. Animated, smiling and joking. Ambulating in the hallway and room. Up to solarium, center of attention, socializing. Additional clothing and wallet brought in from family and placed in storage. Took medications as ordered. Patient demonstrated ability to prick finger and give himself insulin. Provided patient with education on healthy food choices to maintain blood sugar in desired range. Denies SI/HI/AH/VH. Resting in bed at this time.

## 2021-05-14 NOTE — GROUP NOTE
IP  GROUP DOCUMENTATION INDIVIDUAL Group Therapy Note Date: 5/14/2021 Group Start Time: 1100 Group End Time: 6536 Group Topic: Education Group - Inpatient SRM 2  NON ACUTE Apurva Finney [facility] GROUP DOCUMENTATION GROUP Group Therapy Note Facilitated group discussion focused on anxiety, fight or flight responses and how the body handles those types of anxiety provoking situations Attendees: 4/9 Attendance: Attended Patient's Goal:  Attend groups daily Interventions/techniques: Informed and Supported Follows Directions: Followed directions Interactions: Interacted appropriately Mental Status: Anxious and Calm Behavior/appearance: Attentive, Cooperative and Motivated Goals Achieved: Able to engage in interactions, Able to listen to others, Able to give feedback to another and Able to self-disclose Additional Notes:  Pt was receptive to intervention discussion. Pt reported he has previously had racing thoughts and sweaty hands when experiencing anxiety or stress. Pt stated how anxious he was to be going to [facility] upon discharge. Pt was pulled from group early for discharge and did not return. Elida Carlos RT Intern

## 2021-05-14 NOTE — BH NOTES
Patient  Was up for breakfast. He accepted his medication without any problems. He denied any depression, SI or HI. He verbalized some mild  Anxiety due to leaving. 1155 Patient was discharge amb. with all of his belongings. He has his items from the safe. Items from storage. Items from the room. [address] [place], [place] delivered his medications he received them @ the lobby entrance. He was given bottle water & box lunches. He verbalized understanding of his discharge instructions. Close observation was discontinued.

## 2022-03-18 PROBLEM — F32.A DEPRESSION: Status: ACTIVE | Noted: 2021-05-07
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
## 2022-03-19 PROBLEM — F19.10 SUBSTANCE ABUSE (HCC): Status: ACTIVE | Noted: 2021-05-07

## 2023-05-14 RX ORDER — BUPROPION HYDROCHLORIDE 100 MG/1
100 TABLET, EXTENDED RELEASE ORAL DAILY
COMMUNITY
Start: 2021-05-15

## 2023-05-14 RX ORDER — INSULIN GLARGINE 100 [IU]/ML
INJECTION, SOLUTION SUBCUTANEOUS
COMMUNITY
Start: 2021-05-15

## 2023-05-14 RX ORDER — TRAZODONE HYDROCHLORIDE 50 MG/1
50 TABLET ORAL
COMMUNITY
Start: 2021-05-14

## 2023-05-14 RX ORDER — QUETIAPINE FUMARATE 300 MG/1
300 TABLET, FILM COATED ORAL
COMMUNITY
Start: 2021-05-14

## 2023-05-14 RX ORDER — INSULIN LISPRO 100 [IU]/ML
INJECTION, SOLUTION INTRAVENOUS; SUBCUTANEOUS
COMMUNITY
Start: 2021-05-14